# Patient Record
Sex: MALE | Race: WHITE | NOT HISPANIC OR LATINO | URBAN - METROPOLITAN AREA
[De-identification: names, ages, dates, MRNs, and addresses within clinical notes are randomized per-mention and may not be internally consistent; named-entity substitution may affect disease eponyms.]

---

## 2022-01-04 ENCOUNTER — NEW SKIN PROBLEM (OUTPATIENT)
Dept: URBAN - METROPOLITAN AREA CLINIC 38 | Facility: CLINIC | Age: 57
Setting detail: DERMATOLOGY
End: 2022-01-04

## 2022-01-04 DIAGNOSIS — L70.0 ACNE VULGARIS: ICD-10-CM

## 2022-01-04 PROBLEM — L82.1 OTHER SEBORRHEIC KERATOSIS: Status: RESOLVED | Noted: 2022-01-04

## 2022-01-04 PROBLEM — D48.5 NEOPLASM OF UNCERTAIN BEHAVIOR OF SKIN: Status: RESOLVED | Noted: 2022-01-04

## 2022-01-04 PROBLEM — D23.5 OTHER BENIGN NEOPLASM OF SKIN OF TRUNK: Status: RESOLVED | Noted: 2022-01-04

## 2022-01-04 PROCEDURE — 99203 OFFICE O/P NEW LOW 30 MIN: CPT

## 2022-01-26 ENCOUNTER — EXCISION (OUTPATIENT)
Dept: URBAN - METROPOLITAN AREA CLINIC 38 | Facility: CLINIC | Age: 57
Setting detail: DERMATOLOGY
End: 2022-01-26

## 2022-01-26 DIAGNOSIS — D23.5 OTHER BENIGN NEOPLASM OF SKIN OF TRUNK: ICD-10-CM

## 2022-01-26 DIAGNOSIS — Z85.820 PERSONAL HISTORY OF MALIGNANT MELANOMA OF SKIN: ICD-10-CM

## 2022-01-26 DIAGNOSIS — L90.5 SCAR CONDITIONS AND FIBROSIS OF SKIN: ICD-10-CM

## 2022-01-26 DIAGNOSIS — L57.8 OTHER SKIN CHANGES DUE TO CHRONIC EXPOSURE TO NONIONIZING RADIATION: ICD-10-CM

## 2022-01-26 DIAGNOSIS — L82.1 OTHER SEBORRHEIC KERATOSIS: ICD-10-CM

## 2022-01-26 PROBLEM — D48.5 NEOPLASM OF UNCERTAIN BEHAVIOR OF SKIN: Status: RESOLVED | Noted: 2022-01-26

## 2022-01-26 PROCEDURE — 11102 TANGNTL BX SKIN SINGLE LES: CPT

## 2023-04-17 ENCOUNTER — TELEPHONE (OUTPATIENT)
Dept: PRIMARY CARE | Facility: CLINIC | Age: 58
End: 2023-04-17
Payer: COMMERCIAL

## 2023-04-19 ENCOUNTER — OFFICE VISIT (OUTPATIENT)
Dept: PRIMARY CARE | Facility: CLINIC | Age: 58
End: 2023-04-19
Payer: COMMERCIAL

## 2023-04-19 VITALS
BODY MASS INDEX: 36.97 KG/M2 | HEIGHT: 74 IN | OXYGEN SATURATION: 99 % | WEIGHT: 288.1 LBS | HEART RATE: 72 BPM | SYSTOLIC BLOOD PRESSURE: 140 MMHG | DIASTOLIC BLOOD PRESSURE: 98 MMHG

## 2023-04-19 DIAGNOSIS — M10.9 GOUT OF FOOT, UNSPECIFIED CAUSE, UNSPECIFIED CHRONICITY, UNSPECIFIED LATERALITY: ICD-10-CM

## 2023-04-19 DIAGNOSIS — M17.11 PRIMARY OSTEOARTHRITIS OF RIGHT KNEE: ICD-10-CM

## 2023-04-19 DIAGNOSIS — M17.12 PRIMARY OSTEOARTHRITIS OF LEFT KNEE: Primary | ICD-10-CM

## 2023-04-19 DIAGNOSIS — I10 PRIMARY HYPERTENSION: ICD-10-CM

## 2023-04-19 DIAGNOSIS — E03.9 ACQUIRED HYPOTHYROIDISM: ICD-10-CM

## 2023-04-19 PROCEDURE — 1036F TOBACCO NON-USER: CPT | Performed by: FAMILY MEDICINE

## 2023-04-19 PROCEDURE — 99213 OFFICE O/P EST LOW 20 MIN: CPT | Performed by: FAMILY MEDICINE

## 2023-04-19 PROCEDURE — 3077F SYST BP >= 140 MM HG: CPT | Performed by: FAMILY MEDICINE

## 2023-04-19 PROCEDURE — 20610 DRAIN/INJ JOINT/BURSA W/O US: CPT | Performed by: FAMILY MEDICINE

## 2023-04-19 PROCEDURE — 3080F DIAST BP >= 90 MM HG: CPT | Performed by: FAMILY MEDICINE

## 2023-04-19 RX ORDER — INDOMETHACIN 50 MG/1
1 CAPSULE ORAL 2 TIMES DAILY
COMMUNITY
Start: 2023-02-22 | End: 2023-04-19 | Stop reason: SDUPTHER

## 2023-04-19 RX ORDER — LEVOTHYROXINE SODIUM 137 UG/1
1 TABLET ORAL DAILY
COMMUNITY
Start: 2019-10-31 | End: 2023-04-19 | Stop reason: SDUPTHER

## 2023-04-19 RX ORDER — ATENOLOL 100 MG/1
100 TABLET ORAL DAILY
Qty: 90 TABLET | Refills: 3 | Status: SHIPPED | OUTPATIENT
Start: 2023-04-19 | End: 2024-06-05 | Stop reason: WASHOUT

## 2023-04-19 RX ORDER — TRIAMCINOLONE ACETONIDE 40 MG/ML
40 INJECTION, SUSPENSION INTRA-ARTICULAR; INTRAMUSCULAR ONCE
Status: COMPLETED | OUTPATIENT
Start: 2023-04-19 | End: 2023-04-19

## 2023-04-19 RX ORDER — INDOMETHACIN 50 MG/1
50 CAPSULE ORAL 2 TIMES DAILY
Qty: 60 CAPSULE | Refills: 3 | Status: SHIPPED | OUTPATIENT
Start: 2023-04-19 | End: 2023-05-19

## 2023-04-19 RX ORDER — LISINOPRIL AND HYDROCHLOROTHIAZIDE 20; 25 MG/1; MG/1
1 TABLET ORAL DAILY
COMMUNITY
Start: 2020-03-02 | End: 2023-11-01 | Stop reason: ALTCHOICE

## 2023-04-19 RX ORDER — LISINOPRIL AND HYDROCHLOROTHIAZIDE 20; 25 MG/1; MG/1
2 TABLET ORAL DAILY
Qty: 180 TABLET | Refills: 3 | Status: SHIPPED | OUTPATIENT
Start: 2023-04-19 | End: 2024-04-18

## 2023-04-19 RX ORDER — ATENOLOL 100 MG/1
1 TABLET ORAL DAILY
COMMUNITY
Start: 2020-03-02 | End: 2023-04-19 | Stop reason: SDUPTHER

## 2023-04-19 RX ORDER — VENLAFAXINE HYDROCHLORIDE 75 MG/1
CAPSULE, EXTENDED RELEASE ORAL
COMMUNITY
Start: 2021-12-23

## 2023-04-19 RX ORDER — VENLAFAXINE HYDROCHLORIDE 150 MG/1
CAPSULE, EXTENDED RELEASE ORAL
COMMUNITY
Start: 2021-12-23

## 2023-04-19 RX ORDER — LEVOTHYROXINE SODIUM 137 UG/1
137 TABLET ORAL DAILY
Qty: 90 TABLET | Refills: 3 | Status: SHIPPED | OUTPATIENT
Start: 2023-04-19 | End: 2024-04-18

## 2023-04-19 RX ADMIN — TRIAMCINOLONE ACETONIDE 40 MG: 40 INJECTION, SUSPENSION INTRA-ARTICULAR; INTRAMUSCULAR at 12:17

## 2023-04-19 RX ADMIN — TRIAMCINOLONE ACETONIDE 40 MG: 40 INJECTION, SUSPENSION INTRA-ARTICULAR; INTRAMUSCULAR at 12:15

## 2023-04-19 ASSESSMENT — ENCOUNTER SYMPTOMS
HEADACHES: 0
DIZZINESS: 0
LIGHT-HEADEDNESS: 0

## 2023-04-19 ASSESSMENT — PATIENT HEALTH QUESTIONNAIRE - PHQ9
2. FEELING DOWN, DEPRESSED OR HOPELESS: NOT AT ALL
SUM OF ALL RESPONSES TO PHQ9 QUESTIONS 1 AND 2: 0
1. LITTLE INTEREST OR PLEASURE IN DOING THINGS: NOT AT ALL

## 2023-04-19 NOTE — PROGRESS NOTES
"Subjective   Patient ID: Ricardo Newman is a 57 y.o. male who presents for Injections (Cortisone injection both knees ).    HPI   Htn     Not well controlled will increase his lisinopril    ADRIAN knee OA    Has been over a year since last knee injection       Gout foot either side    Indocin works better than  colchicine    Procedure adrian knee   the injection site was marked with the blunt end of pen - medial  joint line  to the medial inferior aspect of the patella. The area was prepped in a sterile fashion using betadine and 70% alcohol. Using a sterile, 'no touch' techinque, 2 ml of 2% lidocaine and 1 ml ofkenalog 40 mg/ml were injected into the joint space via 25 g 1 inch needle; prior to injection, the area was aspirated and there was no blood return. The patient tolerated the procedure well. There was zero blood loss.    Review of Systems   Cardiovascular:  Negative for chest pain.   Neurological:  Negative for dizziness, light-headedness and headaches.       Objective   BP (!) 140/98   Pulse 72   Ht 1.88 m (6' 2\")   Wt 131 kg (288 lb 1.6 oz)   SpO2 99%   BMI 36.99 kg/m²     Physical Exam  Vitals reviewed.   Constitutional:       Appearance: Normal appearance.   HENT:      Head: Normocephalic and atraumatic.   Eyes:      Conjunctiva/sclera: Conjunctivae normal.   Cardiovascular:      Rate and Rhythm: Normal rate and regular rhythm.   Pulmonary:      Effort: Pulmonary effort is normal.      Breath sounds: Normal breath sounds.   Musculoskeletal:      Cervical back: Neck supple.   Skin:     General: Skin is warm and dry.   Neurological:      General: No focal deficit present.      Mental Status: He is alert and oriented to person, place, and time.   Psychiatric:         Mood and Affect: Mood normal.         Behavior: Behavior normal.         Thought Content: Thought content normal.         Judgment: Judgment normal.         Assessment/Plan   Diagnoses and all orders for this visit:  Primary osteoarthritis of " left knee  -     triamcinolone acetonide (Kenalog-40) injection 40 mg  Primary osteoarthritis of right knee  -     triamcinolone acetonide (Kenalog-40) injection 40 mg  Gout of foot, unspecified cause, unspecified chronicity, unspecified laterality  -     indomethacin (Indocin) 50 mg capsule; Take 1 capsule (50 mg) by mouth in the morning and 1 capsule (50 mg) before bedtime.  Primary hypertension  -     atenolol (Tenormin) 100 mg tablet; Take 1 tablet (100 mg) by mouth once daily.  -     lisinopriL-hydrochlorothiazide 20-25 mg tablet; Take 2 tablets by mouth once daily.  Acquired hypothyroidism  -     levothyroxine (Synthroid, Levoxyl) 137 mcg tablet; Take 1 tablet (137 mcg) by mouth once daily.  Other orders  -     Follow Up In Primary Care; Future

## 2023-09-08 ENCOUNTER — TELEPHONE (OUTPATIENT)
Dept: PRIMARY CARE | Facility: CLINIC | Age: 58
End: 2023-09-08

## 2023-09-08 NOTE — TELEPHONE ENCOUNTER
Pt called asking if he was able to get a cortisone shot in each knee, he is unsure when his ;ast one was

## 2023-09-13 NOTE — TELEPHONE ENCOUNTER
Pt called back about this - says where he is calls may not go through - can leave a VM about this.

## 2023-09-13 NOTE — TELEPHONE ENCOUNTER
Called again and left VM to let pt know we can make an apt for Cortisone injection and to call back to make apt.

## 2023-10-12 ENCOUNTER — LAB (OUTPATIENT)
Dept: LAB | Facility: LAB | Age: 58
End: 2023-10-12
Payer: COMMERCIAL

## 2023-10-12 ENCOUNTER — ANCILLARY PROCEDURE (OUTPATIENT)
Dept: RADIOLOGY | Facility: CLINIC | Age: 58
End: 2023-10-12
Payer: COMMERCIAL

## 2023-10-12 ENCOUNTER — OFFICE VISIT (OUTPATIENT)
Dept: PRIMARY CARE | Facility: CLINIC | Age: 58
End: 2023-10-12
Payer: COMMERCIAL

## 2023-10-12 VITALS
BODY MASS INDEX: 36.23 KG/M2 | SYSTOLIC BLOOD PRESSURE: 152 MMHG | DIASTOLIC BLOOD PRESSURE: 90 MMHG | WEIGHT: 282.3 LBS | HEIGHT: 74 IN | HEART RATE: 72 BPM

## 2023-10-12 DIAGNOSIS — M25.531 RIGHT WRIST PAIN: ICD-10-CM

## 2023-10-12 DIAGNOSIS — Z79.1 NSAID LONG-TERM USE: ICD-10-CM

## 2023-10-12 DIAGNOSIS — Z79.1 NSAID LONG-TERM USE: Primary | ICD-10-CM

## 2023-10-12 DIAGNOSIS — M70.21 OLECRANON BURSITIS OF RIGHT ELBOW: ICD-10-CM

## 2023-10-12 PROBLEM — M25.562 LEFT KNEE PAIN: Status: ACTIVE | Noted: 2023-10-12

## 2023-10-12 PROBLEM — I10 BENIGN ESSENTIAL HYPERTENSION: Status: ACTIVE | Noted: 2021-11-09

## 2023-10-12 PROBLEM — E03.9 HYPOTHYROIDISM: Status: ACTIVE | Noted: 2023-10-12

## 2023-10-12 PROBLEM — R16.0 HEPATOMEGALY: Status: ACTIVE | Noted: 2023-10-12

## 2023-10-12 PROBLEM — N28.1 RENAL CYST: Status: ACTIVE | Noted: 2023-10-12

## 2023-10-12 LAB
ANION GAP SERPL CALC-SCNC: 12 MMOL/L (ref 10–20)
BUN SERPL-MCNC: 21 MG/DL (ref 6–23)
CALCIUM SERPL-MCNC: 9.5 MG/DL (ref 8.6–10.3)
CHLORIDE SERPL-SCNC: 103 MMOL/L (ref 98–107)
CO2 SERPL-SCNC: 29 MMOL/L (ref 21–32)
CREAT SERPL-MCNC: 0.77 MG/DL (ref 0.5–1.3)
GFR SERPL CREATININE-BSD FRML MDRD: >90 ML/MIN/1.73M*2
GLUCOSE SERPL-MCNC: 101 MG/DL (ref 74–99)
POTASSIUM SERPL-SCNC: 4.5 MMOL/L (ref 3.5–5.3)
SODIUM SERPL-SCNC: 139 MMOL/L (ref 136–145)

## 2023-10-12 PROCEDURE — 99213 OFFICE O/P EST LOW 20 MIN: CPT | Performed by: NURSE PRACTITIONER

## 2023-10-12 PROCEDURE — 73100 X-RAY EXAM OF WRIST: CPT | Mod: RT

## 2023-10-12 PROCEDURE — 73100 X-RAY EXAM OF WRIST: CPT | Mod: RIGHT SIDE | Performed by: RADIOLOGY

## 2023-10-12 PROCEDURE — 80048 BASIC METABOLIC PNL TOTAL CA: CPT

## 2023-10-12 PROCEDURE — 3080F DIAST BP >= 90 MM HG: CPT | Performed by: NURSE PRACTITIONER

## 2023-10-12 PROCEDURE — 3077F SYST BP >= 140 MM HG: CPT | Performed by: NURSE PRACTITIONER

## 2023-10-12 PROCEDURE — 1036F TOBACCO NON-USER: CPT | Performed by: NURSE PRACTITIONER

## 2023-10-12 PROCEDURE — 36415 COLL VENOUS BLD VENIPUNCTURE: CPT

## 2023-10-12 RX ORDER — INDOMETHACIN 50 MG/1
50 CAPSULE ORAL
COMMUNITY
Start: 2023-09-13 | End: 2024-04-15 | Stop reason: WASHOUT

## 2023-10-12 NOTE — PROGRESS NOTES
"Subjective   Patient ID: Ricadro Newman is a 58 y.o. male who presents for Elbow Pain (Right - Has been going on years) and Wrist Pain (right).    Wrist pain to right wrist, uses hand/wrist with repetitive motions frequently at wok and at leisure  Wrist pain worsening over last week or two  Previous XR showed:  No acute osseous injury of the right wrist.    Is taking indomethacin previously prescribed.   Would like repeat XR    Was seen in Urgent care last week prescribed steroids, only took 1-2 days worth felt better, after steroid stopped pain returned.   Has swelling, warmth to the right elbow, is scheduled to see ortho. Known bursitis                 Review of Systems    Objective   /90 (Patient Position: Sitting)   Pulse 72   Ht 1.88 m (6' 2\")   Wt 128 kg (282 lb 4.8 oz)   BMI 36.25 kg/m²     Physical Exam  Vitals reviewed.   Constitutional:       General: He is not in acute distress.     Appearance: Normal appearance. He is obese.   Cardiovascular:      Rate and Rhythm: Normal rate and regular rhythm.   Pulmonary:      Effort: Pulmonary effort is normal.      Breath sounds: Normal breath sounds.   Musculoskeletal:      Right elbow: Swelling and effusion present. Tenderness present.      Right wrist: Tenderness present. No swelling or deformity. Decreased range of motion.   Skin:     General: Skin is warm and dry.   Neurological:      Mental Status: He is alert and oriented to person, place, and time.         Assessment/Plan   Diagnoses and all orders for this visit:  NSAID long-term use  -     Basic metabolic panel; Future  Continue on Indomethacin twice daily as previously prescribed  Right wrist pain  -     XR wrist right 1-2 views; Future  NSAID as above  Olecranon bursitis of right elbow   Following with orthopedics.      "

## 2023-10-19 ENCOUNTER — APPOINTMENT (OUTPATIENT)
Dept: PRIMARY CARE | Facility: CLINIC | Age: 58
End: 2023-10-19
Payer: COMMERCIAL

## 2023-10-30 RX ORDER — COLCHICINE 0.6 MG/1
0.6 TABLET ORAL 3 TIMES DAILY
COMMUNITY
End: 2023-11-01 | Stop reason: ALTCHOICE

## 2023-11-01 ENCOUNTER — OFFICE VISIT (OUTPATIENT)
Dept: ORTHOPEDIC SURGERY | Facility: CLINIC | Age: 58
End: 2023-11-01
Payer: COMMERCIAL

## 2023-11-01 ENCOUNTER — ANCILLARY PROCEDURE (OUTPATIENT)
Dept: RADIOLOGY | Facility: CLINIC | Age: 58
End: 2023-11-01
Payer: COMMERCIAL

## 2023-11-01 DIAGNOSIS — M25.562 ACUTE BILATERAL KNEE PAIN: ICD-10-CM

## 2023-11-01 DIAGNOSIS — M25.561 ACUTE BILATERAL KNEE PAIN: ICD-10-CM

## 2023-11-01 DIAGNOSIS — M17.0 PRIMARY OSTEOARTHRITIS OF BOTH KNEES: Primary | ICD-10-CM

## 2023-11-01 PROCEDURE — 1036F TOBACCO NON-USER: CPT | Performed by: NURSE PRACTITIONER

## 2023-11-01 PROCEDURE — 3077F SYST BP >= 140 MM HG: CPT | Performed by: NURSE PRACTITIONER

## 2023-11-01 PROCEDURE — 73562 X-RAY EXAM OF KNEE 3: CPT | Mod: 50

## 2023-11-01 PROCEDURE — 73562 X-RAY EXAM OF KNEE 3: CPT | Mod: BILATERAL PROCEDURE | Performed by: RADIOLOGY

## 2023-11-01 PROCEDURE — 3080F DIAST BP >= 90 MM HG: CPT | Performed by: NURSE PRACTITIONER

## 2023-11-01 PROCEDURE — 99204 OFFICE O/P NEW MOD 45 MIN: CPT | Performed by: NURSE PRACTITIONER

## 2023-11-01 ASSESSMENT — ENCOUNTER SYMPTOMS
RESPIRATORY NEGATIVE: 1
ARTHRALGIAS: 1
HEMATOLOGIC/LYMPHATIC NEGATIVE: 1
CONSTITUTIONAL NEGATIVE: 1
NEUROLOGICAL NEGATIVE: 1
ENDOCRINE NEGATIVE: 1
PSYCHIATRIC NEGATIVE: 1
CARDIOVASCULAR NEGATIVE: 1

## 2023-11-01 ASSESSMENT — PAIN SCALES - GENERAL: PAINLEVEL_OUTOF10: 6

## 2023-11-01 ASSESSMENT — PAIN DESCRIPTION - DESCRIPTORS: DESCRIPTORS: SHARP;ACHING

## 2023-11-01 ASSESSMENT — PAIN - FUNCTIONAL ASSESSMENT: PAIN_FUNCTIONAL_ASSESSMENT: 0-10

## 2023-11-01 NOTE — ASSESSMENT & PLAN NOTE
Reviewed symptom control with NSAIDs per her PCP.  I did discuss that we do not typically keep patients on indomethacin for arthritis pain and will need to follow with PCP regarding this particular medication.  I did instruct patient on extra strength Tylenol or Tylenol arthritis as needed per package directions.  Also recommended diclofenac gel 4 times daily to the bilateral knees.  Patient may continue to use the compression knee braces he has with aggravating activities.  We did discuss risk of benefits of both cortisone and viscosupplementation.  At this time, I am requesting viscosupplementation to the bilateral knees for symptom control.  Patient is aware this will be administered once weekly over 3 weeks with use of the ultrasound here in the office.  Patient is in agreement with plan of care.  This note was generated using Dragon software.  It may contain errors in wording, punctuation or spelling.
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
## 2023-11-13 ENCOUNTER — OFFICE VISIT (OUTPATIENT)
Dept: ORTHOPEDIC SURGERY | Facility: CLINIC | Age: 58
End: 2023-11-13
Payer: COMMERCIAL

## 2023-11-13 DIAGNOSIS — M25.561 ACUTE BILATERAL KNEE PAIN: ICD-10-CM

## 2023-11-13 DIAGNOSIS — M17.0 PRIMARY OSTEOARTHRITIS OF BOTH KNEES: Primary | ICD-10-CM

## 2023-11-13 DIAGNOSIS — M25.562 ACUTE BILATERAL KNEE PAIN: ICD-10-CM

## 2023-11-13 PROCEDURE — 1036F TOBACCO NON-USER: CPT | Performed by: NURSE PRACTITIONER

## 2023-11-13 PROCEDURE — 3077F SYST BP >= 140 MM HG: CPT | Performed by: NURSE PRACTITIONER

## 2023-11-13 PROCEDURE — 20611 DRAIN/INJ JOINT/BURSA W/US: CPT | Performed by: NURSE PRACTITIONER

## 2023-11-13 PROCEDURE — 3080F DIAST BP >= 90 MM HG: CPT | Performed by: NURSE PRACTITIONER

## 2023-11-13 ASSESSMENT — ENCOUNTER SYMPTOMS
RESPIRATORY NEGATIVE: 1
CARDIOVASCULAR NEGATIVE: 1
NEUROLOGICAL NEGATIVE: 1
HEMATOLOGIC/LYMPHATIC NEGATIVE: 1
ENDOCRINE NEGATIVE: 1
CONSTITUTIONAL NEGATIVE: 1
PSYCHIATRIC NEGATIVE: 1
ARTHRALGIAS: 1

## 2023-11-13 NOTE — ASSESSMENT & PLAN NOTE
Sx control with OTC Tylenol per package directions prn.  Rest, ice, elevate and compression/brace with weight bearing activity, off with rest and sleep  Knee bracing with repetitive or aggravating activities, may remove with rest and sleep.   HEP  Activity restriction recommended: light activity today, advance as tolerated  Follow up here 1 week for second visco injections, sooner for changes or concerns.

## 2023-11-13 NOTE — PROGRESS NOTES
Assessment/Plan     Encounter Diagnoses:  Primary osteoarthritis of both knees  Sx control with OTC Tylenol per package directions prn.  Rest, ice, elevate and compression/brace with weight bearing activity, off with rest and sleep  Knee bracing with repetitive or aggravating activities, may remove with rest and sleep.   HEP  Activity restriction recommended: light activity today, advance as tolerated  Follow up here 1 week for second visco injections, sooner for changes or concerns.      Problem List Items Addressed This Visit             ICD-10-CM    Acute bilateral knee pain M25.561, M25.562    Relevant Orders    Point of Care Ultrasound (Completed)    Follow Up In Orthopaedic Surgery        Subjective    Patient ID: Ricardo Newman is a 58 y.o. male.  Presents today for visco supplementation of Euflexxa  Injection # 1/3 bilat knees for OA         Review of Systems   Constitutional: Negative.    HENT: Negative.     Respiratory: Negative.     Cardiovascular: Negative.    Endocrine: Negative.    Musculoskeletal:  Positive for arthralgias.   Skin: Negative.    Neurological: Negative.    Hematological: Negative.    Psychiatric/Behavioral: Negative.           OBJECTIVE: ORTHO EXAM  Right Knee Exam      Tenderness   The patient is experiencing tenderness in the medial joint line.     Range of Motion   Extension:  normal   Flexion:  120 abnormal      Tests   Soren:  Medial - negative Lateral - negative  Varus: negative Valgus: negative  Drawer:  Anterior - negative    Posterior - negative     Other   Erythema: absent  Sensation: normal  Pulse: present  Swelling: none  Effusion: no effusion present     Comments:  Medial knee symptoms mildly aggravated with attempt at squat and rotation with weightbearing.  Full ROM of distal joints with no sx aggravation  Distal motor and sensory intact, cap refill at 2 seconds.        Left Knee Exam      Tenderness   The patient is experiencing tenderness in the medial joint line and  lateral joint line.     Range of Motion   Extension:  5 abnormal   Flexion:  100 abnormal      Tests   Soren:  Medial - negative Lateral - negative  Varus: negative Valgus: negative  Drawer:  Anterior - negative     Posterior - negative     Other   Erythema: absent  Sensation: normal  Pulse: present  Left knee swelling: Patient has marble sized fixed swelling patellar tendon, nonmobile.  No tenderness with palpation or motion, no changes in sizes or appearance.     Comments:  Mild varus gait on L  Full ROM of distal joints with no sx aggravation  Distal motor and sensory intact, cap refill at 2 seconds.      IMAGE RESULTS:  XR knee 3 views bilateral  Narrative: Interpreted By:  Kris Carballo,   STUDY:  XR KNEE 3 VIEWS BILATERAL      INDICATION:  Signs/Symptoms:Pain.      COMPARISON:  None      ACCESSION NUMBER(S):  [id number]      ORDERING CLINICIAN:  RUFINO DENSON      FINDINGS:  Fairly advanced osteoarthritis bilateral knees particularly in the  medial compartments.      No fractures or lesions bilaterally.      Impression: Fairly advanced osteoarthritis bilateral knees particularly in the  medial compartments.      Signed by: Kris Carballo 11/2/2023 12:20 PM  Dictation workstation:   [id number]        L Inj/Asp: bilateral knee on 11/13/2023 4:19 PM  Indications: pain and joint swelling  Details: 22 G needle, ultrasound-guided superolateral approach  Medications (Right): 20 mg sodium hyaluronate 10 mg/mL(mw 2.4 -3.6 million)  Medications (Left): 20 mg sodium hyaluronate 10 mg/mL(mw 2.4 -3.6 million)  Outcome: tolerated well, no immediate complications    We discussed risk and benefits of cortisone injection, patient wishes to proceed via verbal consent.  Skin was prepped with Betadine, Vapocoolant spray and alcohol.  Direct visualization using high-frequency linear probe of ultrasound was utilized to administer the injection of *.   Images were saved under MRN number into the PACS system.     Procedure,  treatment alternatives, risks and benefits explained, specific risks discussed. Consent was given by the patient. Immediately prior to procedure a time out was called to verify the correct patient, procedure, equipment, support staff and site/side marked as required. Patient was prepped and draped in the usual sterile fashion.            Orders Placed This Encounter    Point of Care Ultrasound    Follow Up In Orthopaedic Surgery

## 2023-11-20 ENCOUNTER — OFFICE VISIT (OUTPATIENT)
Dept: ORTHOPEDIC SURGERY | Facility: CLINIC | Age: 58
End: 2023-11-20
Payer: COMMERCIAL

## 2023-11-20 DIAGNOSIS — M17.0 PRIMARY OSTEOARTHRITIS OF BOTH KNEES: ICD-10-CM

## 2023-11-20 PROCEDURE — 20611 DRAIN/INJ JOINT/BURSA W/US: CPT | Performed by: NURSE PRACTITIONER

## 2023-11-20 PROCEDURE — 1036F TOBACCO NON-USER: CPT | Performed by: NURSE PRACTITIONER

## 2023-11-20 PROCEDURE — 99024 POSTOP FOLLOW-UP VISIT: CPT | Performed by: NURSE PRACTITIONER

## 2023-11-20 ASSESSMENT — ENCOUNTER SYMPTOMS
ARTHRALGIAS: 1
PSYCHIATRIC NEGATIVE: 1
HEMATOLOGIC/LYMPHATIC NEGATIVE: 1
CARDIOVASCULAR NEGATIVE: 1
RESPIRATORY NEGATIVE: 1
NEUROLOGICAL NEGATIVE: 1
ENDOCRINE NEGATIVE: 1
CONSTITUTIONAL NEGATIVE: 1

## 2023-11-20 ASSESSMENT — PAIN - FUNCTIONAL ASSESSMENT: PAIN_FUNCTIONAL_ASSESSMENT: NO/DENIES PAIN

## 2023-11-20 NOTE — PROGRESS NOTES
Assessment/Plan     Encounter Diagnoses:    Problem List Items Addressed This Visit             ICD-10-CM    Primary osteoarthritis of both knees M17.0    Relevant Orders    Point of Care Ultrasound (Completed)        HPI:  Patient ID: Ricardo Newman is a 58 y.o. male.  Presents today for visco supplementation of Euflexxa  Injection # 2/3 bilat knees for OA  No adverse reaction to last week's injections, mild sx improvement.        Review of Systems   Constitutional: Negative.    HENT: Negative.     Respiratory: Negative.     Cardiovascular: Negative.    Endocrine: Negative.    Musculoskeletal:  Positive for arthralgias.   Skin: Negative.    Neurological: Negative.    Hematological: Negative.    Psychiatric/Behavioral: Negative.           OBJECTIVE: ORTHO EXAM  Right Knee Exam      Tenderness   The patient is experiencing tenderness in the medial joint line.     Range of Motion   Extension:  normal   Flexion:  120 abnormal      Tests   Soren:  Medial - negative Lateral - negative  Varus: negative Valgus: negative  Drawer:  Anterior - negative    Posterior - negative     Other   Erythema: absent  Sensation: normal  Pulse: present  Swelling: none  Effusion: no effusion present     Comments:  Medial knee symptoms mildly aggravated with attempt at squat and rotation with weightbearing.  Full ROM of distal joints with no sx aggravation  Distal motor and sensory intact, cap refill at 2 seconds.        Left Knee Exam      Tenderness   The patient is experiencing tenderness in the medial joint line and lateral joint line.     Range of Motion   Extension:  5 abnormal   Flexion:  100 abnormal      Tests   Soren:  Medial - negative Lateral - negative  Varus: negative Valgus: negative  Drawer:  Anterior - negative     Posterior - negative     Other   Erythema: absent  Sensation: normal  Pulse: present  Left knee swelling: Patient has marble sized fixed swelling patellar tendon, nonmobile.  No tenderness with palpation or  motion, no changes in sizes or appearance.     Comments:  Mild varus gait on L  Full ROM of distal joints with no sx aggravation  Distal motor and sensory intact, cap refill at 2 seconds.      IMAGE RESULTS:  XR knee 3 views bilateral  Narrative: Interpreted By:  Kris Carballo,   STUDY:  XR KNEE 3 VIEWS BILATERAL      INDICATION:  Signs/Symptoms:Pain.      COMPARISON:  None      ACCESSION NUMBER(S):  [id number]      ORDERING CLINICIAN:  RUFINO DENSON      FINDINGS:  Fairly advanced osteoarthritis bilateral knees particularly in the  medial compartments.      No fractures or lesions bilaterally.      Impression: Fairly advanced osteoarthritis bilateral knees particularly in the  medial compartments.      Signed by: Kris Carballo 11/2/2023 12:20 PM  Dictation workstation:   [id number]        L Inj/Asp: bilateral knee on 11/20/2023 3:21 PM  Indications: pain and joint swelling  Details: 22 G needle, ultrasound-guided superolateral approach  Medications (Right): 20 mg sodium hyaluronate (Euflexxa) intra-articular prefilled syringe 20 mg/2mL  Outcome: tolerated well, no immediate complications    Patient wishes to proceed via verbal consent.  Skin was prepped with Betadine, Vapocoolant spray and alcohol.  Direct visualization using high-frequency linear probe of ultrasound was utilized to administer the injection of Euflexxa.   Images were saved under MRN number into the PACS system.   Bandaid to site post injection, no bleeding.   Euflexxa to bilat knees     Procedure, treatment alternatives, risks and benefits explained, specific risks discussed. Consent was given by the patient. Immediately prior to procedure a time out was called to verify the correct patient, procedure, equipment, support staff and site/side marked as required. Patient was prepped and draped in the usual sterile fashion.            Orders Placed This Encounter    Point of Care Ultrasound

## 2023-11-27 ENCOUNTER — OFFICE VISIT (OUTPATIENT)
Dept: ORTHOPEDIC SURGERY | Facility: CLINIC | Age: 58
End: 2023-11-27
Payer: COMMERCIAL

## 2023-11-27 DIAGNOSIS — M17.0 PRIMARY OSTEOARTHRITIS OF BOTH KNEES: ICD-10-CM

## 2023-11-27 PROCEDURE — 20611 DRAIN/INJ JOINT/BURSA W/US: CPT | Performed by: NURSE PRACTITIONER

## 2023-11-27 PROCEDURE — 1036F TOBACCO NON-USER: CPT | Performed by: NURSE PRACTITIONER

## 2023-11-27 PROCEDURE — 99024 POSTOP FOLLOW-UP VISIT: CPT | Performed by: NURSE PRACTITIONER

## 2023-11-27 ASSESSMENT — ENCOUNTER SYMPTOMS
CONSTITUTIONAL NEGATIVE: 1
CARDIOVASCULAR NEGATIVE: 1
RESPIRATORY NEGATIVE: 1
ENDOCRINE NEGATIVE: 1
HEMATOLOGIC/LYMPHATIC NEGATIVE: 1
NEUROLOGICAL NEGATIVE: 1
ARTHRALGIAS: 1
PSYCHIATRIC NEGATIVE: 1

## 2023-11-27 ASSESSMENT — PAIN - FUNCTIONAL ASSESSMENT: PAIN_FUNCTIONAL_ASSESSMENT: 0-10

## 2023-11-27 ASSESSMENT — PAIN SCALES - GENERAL: PAINLEVEL_OUTOF10: 5 - MODERATE PAIN

## 2023-11-27 ASSESSMENT — PAIN DESCRIPTION - DESCRIPTORS: DESCRIPTORS: ACHING;DULL

## 2023-11-27 NOTE — ASSESSMENT & PLAN NOTE
We reviewed conservative measures for knee OA symptom control.  Patient to continue to take Tylenol per package directions, diclofenac gel up to 4 times daily.  We reviewed bracing and wrapping for aggravating activities.  Reviewed rest and elevation.  Home exercises for knee OA were reviewed and patient encouraged to begin in approximately 1 week and advance as tolerated.  Reviewed plan for gradual resumption of stationary bike with no resistance, low time and advance as tolerated. Plan will be to follow-up here in approximately 3 months, sooner for changes or concerns.     This note was generated using Dragon software.  It may contain errors in wording, punctuation or spelling.

## 2023-11-27 NOTE — PROGRESS NOTES
Assessment/Plan     Encounter Diagnoses:    Problem List Items Addressed This Visit             ICD-10-CM    Primary osteoarthritis of both knees M17.0    Relevant Orders    Point of Care Ultrasound (Completed)        HPI:  Patient ID: Ricardo Newman is a 58 y.o. male.  Presents today for visco supplementation of Euflexxa  Injection # 3/3 bilat knees for OA  No adverse reaction to last week's injections, mild sx improvement.        Review of Systems   Constitutional: Negative.    HENT: Negative.     Respiratory: Negative.     Cardiovascular: Negative.    Endocrine: Negative.    Musculoskeletal:  Positive for arthralgias.   Skin: Negative.    Neurological: Negative.    Hematological: Negative.    Psychiatric/Behavioral: Negative.           OBJECTIVE: ORTHO EXAM  Right Knee Exam      Tenderness   The patient is experiencing tenderness in the medial joint line.     Range of Motion   Extension:  normal   Flexion:  120 abnormal      Tests   Soren:  Medial - negative Lateral - negative  Varus: negative Valgus: negative  Drawer:  Anterior - negative    Posterior - negative     Other   Erythema: absent  Sensation: normal  Pulse: present  Swelling: none  Effusion: no effusion present     Comments:  Medial knee symptoms mildly aggravated with attempt at squat and rotation with weightbearing.  Full ROM of distal joints with no sx aggravation  Distal motor and sensory intact, cap refill at 2 seconds.        Left Knee Exam      Tenderness   The patient is experiencing tenderness in the medial joint line and lateral joint line.     Range of Motion   Extension:  5 abnormal   Flexion:  100 abnormal      Tests   Soren:  Medial - negative Lateral - negative  Varus: negative Valgus: negative  Drawer:  Anterior - negative     Posterior - negative     Other   Erythema: absent  Sensation: normal  Pulse: present  Left knee swelling: Patient has marble sized fixed swelling patellar tendon, nonmobile.  No tenderness with palpation or  motion, no changes in sizes or appearance.     Comments:  Mild varus gait on L  Full ROM of distal joints with no sx aggravation  Distal motor and sensory intact, cap refill at 2 seconds.      IMAGE RESULTS:  XR knee 3 views bilateral  Narrative: Interpreted By:  Kris Carballo,   STUDY:  XR KNEE 3 VIEWS BILATERAL      INDICATION:  Signs/Symptoms:Pain.      COMPARISON:  None      ACCESSION NUMBER(S):  [id number]      ORDERING CLINICIAN:  RUFINO DENSON      FINDINGS:  Fairly advanced osteoarthritis bilateral knees particularly in the  medial compartments.      No fractures or lesions bilaterally.      Impression: Fairly advanced osteoarthritis bilateral knees particularly in the  medial compartments.      Signed by: Kris aCrballo 11/2/2023 12:20 PM  Dictation workstation:   [id number]        L Inj/Asp: bilateral knee on 11/27/2023 4:18 PM  Indications: pain and joint swelling  Details: 22 G needle, ultrasound-guided superolateral approach  Medications (Right): 20 mg sodium hyaluronate 10 mg/mL(mw 2.4 -3.6 million)  Medications (Left): 20 mg sodium hyaluronate 10 mg/mL(mw 2.4 -3.6 million)  Outcome: tolerated well, no immediate complications    Patient wishes to proceed via verbal consent.  Skin was prepped with Betadine, Vapocoolant spray and alcohol.  Direct visualization using high-frequency linear probe of ultrasound was utilized to administer the injection of Euflexxa bilat knees.   Images were saved under MRN number into the PACS system.   Bandaid to site post injection, no bleeding.        Procedure, treatment alternatives, risks and benefits explained, specific risks discussed. Consent was given by the patient. Immediately prior to procedure a time out was called to verify the correct patient, procedure, equipment, support staff and site/side marked as required. Patient was prepped and draped in the usual sterile fashion.            Orders Placed This Encounter    Point of Care Ultrasound      Problem List  Items Addressed This Visit             ICD-10-CM    Primary osteoarthritis of both knees M17.0     We reviewed conservative measures for knee OA symptom control.  Patient to continue to take Tylenol per package directions, diclofenac gel up to 4 times daily.  We reviewed bracing and wrapping for aggravating activities.  Reviewed rest and elevation.  Home exercises for knee OA were reviewed and patient encouraged to begin in approximately 1 week and advance as tolerated.  Reviewed plan for gradual resumption of stationary bike with no resistance, low time and advance as tolerated. Plan will be to follow-up here in approximately 3 months, sooner for changes or concerns.     This note was generated using Dragon software.  It may contain errors in wording, punctuation or spelling.         Relevant Orders    Point of Care Ultrasound (Completed)    Follow Up In Orthopaedic Surgery

## 2024-01-15 ENCOUNTER — OFFICE VISIT (OUTPATIENT)
Dept: ORTHOPEDIC SURGERY | Facility: CLINIC | Age: 59
End: 2024-01-15
Payer: COMMERCIAL

## 2024-01-15 DIAGNOSIS — M17.0 PRIMARY OSTEOARTHRITIS OF BOTH KNEES: Primary | ICD-10-CM

## 2024-01-15 PROCEDURE — 99214 OFFICE O/P EST MOD 30 MIN: CPT | Performed by: NURSE PRACTITIONER

## 2024-01-15 PROCEDURE — 20611 DRAIN/INJ JOINT/BURSA W/US: CPT | Performed by: NURSE PRACTITIONER

## 2024-01-15 PROCEDURE — 1036F TOBACCO NON-USER: CPT | Performed by: NURSE PRACTITIONER

## 2024-01-15 RX ORDER — TRIAMCINOLONE ACETONIDE 40 MG/ML
40 INJECTION, SUSPENSION INTRA-ARTICULAR; INTRAMUSCULAR
Status: COMPLETED | OUTPATIENT
Start: 2024-01-15 | End: 2024-01-15

## 2024-01-15 RX ORDER — MELOXICAM 15 MG/1
15 TABLET ORAL ONCE
Status: SHIPPED | OUTPATIENT
Start: 2024-01-15

## 2024-01-15 RX ADMIN — TRIAMCINOLONE ACETONIDE 40 MG: 40 INJECTION, SUSPENSION INTRA-ARTICULAR; INTRAMUSCULAR at 12:20

## 2024-01-15 ASSESSMENT — PAIN - FUNCTIONAL ASSESSMENT: PAIN_FUNCTIONAL_ASSESSMENT: 0-10

## 2024-01-15 ASSESSMENT — ENCOUNTER SYMPTOMS
PSYCHIATRIC NEGATIVE: 1
ENDOCRINE NEGATIVE: 1
CONSTITUTIONAL NEGATIVE: 1
HEMATOLOGIC/LYMPHATIC NEGATIVE: 1
CARDIOVASCULAR NEGATIVE: 1
ARTHRALGIAS: 1
NEUROLOGICAL NEGATIVE: 1
RESPIRATORY NEGATIVE: 1

## 2024-01-15 ASSESSMENT — PAIN SCALES - GENERAL: PAINLEVEL_OUTOF10: 5 - MODERATE PAIN

## 2024-01-15 ASSESSMENT — PAIN DESCRIPTION - DESCRIPTORS: DESCRIPTORS: ACHING;SHARP

## 2024-01-15 NOTE — ASSESSMENT & PLAN NOTE
We reviewed conservative measures for knee OA symptom control.  Patient to continue to take Tylenol per package directions, diclofenac gel up to 4 times daily.  We reviewed bracing and wrapping for aggravating activities.  Resumption of home exercises for knee OA in approximately 1 week and advance as tolerated.  Reviewed plan for gradual resumption of stationary bike with no resistance, low time and advance as tolerated. Plan will be to follow-up here in approximately 2-3 months, sooner for changes or concerns.   Patient with multiple questions about surgical intervention and consult, we reviewed sx control and will plan for next appt with Dr. Pina to assess effectiveness of the cortisone injections as well as introduce surgical options and answer any questions.  Patient did inquire about a refill of his long-term indomethacin he has been taking for arthritis pain.  We did discuss other options such as meloxicam or Celebrex for arthritis pain management, especially knee arthritis.  Patient is interested in trying the meloxicam once daily, may use Tylenol or Tylenol arthritis in addition to this.  He was instructed not to take other NSAIDs while on this medication.  This note was generated using Dragon software.  It may contain errors in wording, punctuation or spelling.

## 2024-01-15 NOTE — PROGRESS NOTES
Assessment/Plan     Encounter Diagnoses:        HPI:  Patient ID: Ricardo Newman is a 58 y.o. male.    Ricardo is a pleasant 58-year-old gentleman presenting today for 8-week follow-up after viscosupplementation to bilateral knees.  Does not feel visco inj helped sx, very short term increased ROM  With some improvement in the past with cortisone and is interested in pursuing if that is possible today  R knee medial aggravated, needs more frequent breaks  Feels tight in posterior knees      Review of Systems   Constitutional: Negative.    HENT: Negative.     Respiratory: Negative.     Cardiovascular: Negative.    Endocrine: Negative.    Musculoskeletal:  Positive for arthralgias.   Skin: Negative.    Neurological: Negative.    Hematological: Negative.    Psychiatric/Behavioral: Negative.           OBJECTIVE: ORTHO EXAM  Right Knee Exam      Tenderness   The patient is experiencing tenderness in the medial joint line.     Range of Motion   Extension:  normal   Flexion:  120 abnormal      Tests   Soren:  Medial - positive Lateral - negative  Varus: negative Valgus: negative  Drawer:  Anterior - negative    Posterior - negative     Other   Erythema: absent  Sensation: normal  Pulse: present  Swelling: none  Effusion: no effusion present     Comments:  Medial knee symptoms aggravated with attempt at squat and rotation with weightbearing.  Full ROM of distal joints with no sx aggravation  Distal motor and sensory intact, cap refill at 2 seconds.        Left Knee Exam      Tenderness   The patient is experiencing tenderness in the medial joint line and lateral joint line.     Range of Motion   Extension:  5 abnormal   Flexion:  110 abnormal      Tests   Soren:  Medial - negative Lateral - negative  Varus: negative Valgus: negative  Drawer:  Anterior - negative     Posterior - negative     Other   Erythema: absent  Sensation: normal  Pulse: present  Left knee swelling: Patient has marble sized fixed swelling patellar  tendon, nonmobile.  No tenderness with palpation or motion, no changes in sizes or appearance.     Comments:  Mild varus gait on L  Full ROM of distal joints with no sx aggravation  Distal motor and sensory intact, cap refill at 2 seconds.      IMAGE RESULTS:  XR knee 3 views bilateral  Narrative: Interpreted By:  Kris Carballo,   STUDY:  XR KNEE 3 VIEWS BILATERAL      INDICATION:  Signs/Symptoms:Pain.      COMPARISON:  None      ACCESSION NUMBER(S):  [id number]      ORDERING CLINICIAN:  RUFINO DENSON      FINDINGS:  Fairly advanced osteoarthritis bilateral knees particularly in the  medial compartments.      No fractures or lesions bilaterally.      Impression: Fairly advanced osteoarthritis bilateral knees particularly in the  medial compartments.      Signed by: Kris Carballo 11/2/2023 12:20 PM  Dictation workstation:   [id number]        L Inj/Asp: bilateral knee on 1/15/2024 12:20 PM  Indications: pain and joint swelling  Details: 22 G needle, ultrasound-guided superolateral approach  Medications (Right): 40 mg triamcinolone acetonide 40 mg/mL  Medications (Left): 40 mg triamcinolone acetonide 40 mg/mL  Outcome: tolerated well, no immediate complications    We discussed risk and benefits of cortisone injection, patient wishes to proceed via verbal consent.  Skin was prepped with Betadine, Vapocoolant spray and alcohol.  Direct visualization using high-frequency linear probe of ultrasound was utilized to administer the injection of 40 mg Kenalog, 3 cc 1% lidocaine and 3 cc of bupivacaine.  Patient tolerated injection well lidocaine suppression, applied to site postinjection.  Images were saved under MRN number into the PACS system.   Procedure, treatment alternatives, risks and benefits explained, specific risks discussed. Consent was given by the patient. Immediately prior to procedure a time out was called to verify the correct patient, procedure, equipment, support staff and site/side marked as required.  Patient was prepped and draped in the usual sterile fashion.

## 2024-01-16 ENCOUNTER — TELEPHONE (OUTPATIENT)
Dept: PRIMARY CARE | Facility: CLINIC | Age: 59
End: 2024-01-16
Payer: COMMERCIAL

## 2024-01-16 NOTE — TELEPHONE ENCOUNTER
Asking for a 2 week supply of Meloxicam. Was supposed to be sent in from Dr. Diego, but the pharmacy hasn't received it yet.

## 2024-01-17 ENCOUNTER — TELEPHONE (OUTPATIENT)
Dept: ORTHOPEDIC SURGERY | Facility: CLINIC | Age: 59
End: 2024-01-17
Payer: COMMERCIAL

## 2024-01-17 DIAGNOSIS — Z79.1 NSAID LONG-TERM USE: Primary | ICD-10-CM

## 2024-01-17 RX ORDER — MELOXICAM 15 MG/1
15 TABLET ORAL DAILY
Qty: 30 TABLET | Refills: 0 | Status: SHIPPED | OUTPATIENT
Start: 2024-01-17 | End: 2024-02-16

## 2024-01-17 NOTE — TELEPHONE ENCOUNTER
Patient called and stated that a 2 month RX of Meloxicam was supposed to be sent to his pharmacy yesterday 1/16/24 and it was not. So he called what he thought was our office but was actually Dr. Rand's office and left a voicemail. The PCP ended up calling the RX in for only one month. The patient would like to know if we can send the second month over or will he just need to wait until the RX from PCP is completed and then call here for a refill. Please advise and contact the patient.

## 2024-02-08 ENCOUNTER — OFFICE VISIT (OUTPATIENT)
Dept: ORTHOPEDIC SURGERY | Facility: CLINIC | Age: 59
End: 2024-02-08
Payer: COMMERCIAL

## 2024-02-08 DIAGNOSIS — M17.0 PRIMARY OSTEOARTHRITIS OF BOTH KNEES: ICD-10-CM

## 2024-02-08 PROCEDURE — 1036F TOBACCO NON-USER: CPT | Performed by: SPECIALIST

## 2024-02-08 PROCEDURE — 20611 DRAIN/INJ JOINT/BURSA W/US: CPT | Performed by: SPECIALIST

## 2024-02-08 PROCEDURE — 99214 OFFICE O/P EST MOD 30 MIN: CPT | Performed by: SPECIALIST

## 2024-02-08 RX ORDER — TRIAMCINOLONE ACETONIDE 40 MG/ML
2.5 INJECTION, SUSPENSION INTRA-ARTICULAR; INTRAMUSCULAR
Status: COMPLETED | OUTPATIENT
Start: 2024-02-08 | End: 2024-02-08

## 2024-02-08 RX ADMIN — TRIAMCINOLONE ACETONIDE 2.5 MG: 40 INJECTION, SUSPENSION INTRA-ARTICULAR; INTRAMUSCULAR at 12:28

## 2024-02-08 ASSESSMENT — PAIN SCALES - GENERAL: PAINLEVEL_OUTOF10: 2

## 2024-02-08 ASSESSMENT — PAIN DESCRIPTION - DESCRIPTORS: DESCRIPTORS: ACHING;SORE

## 2024-02-08 ASSESSMENT — PAIN - FUNCTIONAL ASSESSMENT: PAIN_FUNCTIONAL_ASSESSMENT: 0-10

## 2024-02-08 NOTE — ASSESSMENT & PLAN NOTE
Assessment: Bilateral knee arthritis varus.  Patient is still working as a  for the "[employer].  His job is very demanding and difficult to modify his activities.  He did say that he is no longer able to squat or kneel easily.    Plan:  Ultrasound-guided cortisone injections were performed bilaterally.  He had an excellent lidocaine suppression test.  Follow-up in 4 to 6 weeks for reevaluation.  Consider MRI scan if the cortisone injections do not give him reasonable relief.  I discussed the risks and benefits of total knee replacement with him.  He is not yet interested in pursuing knee replacement but was happy to begin the discussion about those risks and benefits.
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
## 2024-03-07 ENCOUNTER — OFFICE VISIT (OUTPATIENT)
Dept: ORTHOPEDIC SURGERY | Facility: CLINIC | Age: 59
End: 2024-03-07
Payer: COMMERCIAL

## 2024-03-07 ENCOUNTER — HOSPITAL ENCOUNTER (OUTPATIENT)
Dept: RADIOLOGY | Facility: CLINIC | Age: 59
Discharge: HOME | End: 2024-03-07
Payer: COMMERCIAL

## 2024-03-07 DIAGNOSIS — M17.0 PRIMARY OSTEOARTHRITIS OF BOTH KNEES: ICD-10-CM

## 2024-03-07 PROCEDURE — 99214 OFFICE O/P EST MOD 30 MIN: CPT | Performed by: SPECIALIST

## 2024-03-07 PROCEDURE — 76882 US LMTD JT/FCL EVL NVASC XTR: CPT | Performed by: SPECIALIST

## 2024-03-07 PROCEDURE — 1036F TOBACCO NON-USER: CPT | Performed by: SPECIALIST

## 2024-03-07 PROCEDURE — 73564 X-RAY EXAM KNEE 4 OR MORE: CPT | Mod: 50

## 2024-03-07 PROCEDURE — 73564 X-RAY EXAM KNEE 4 OR MORE: CPT | Mod: BILATERAL PROCEDURE | Performed by: RADIOLOGY

## 2024-03-07 RX ORDER — MELOXICAM 15 MG/1
15 TABLET ORAL DAILY
Qty: 30 TABLET | Refills: 11 | Status: SHIPPED | OUTPATIENT
Start: 2024-03-07 | End: 2025-03-07

## 2024-03-07 ASSESSMENT — PAIN - FUNCTIONAL ASSESSMENT: PAIN_FUNCTIONAL_ASSESSMENT: 0-10

## 2024-03-07 ASSESSMENT — PAIN SCALES - GENERAL: PAINLEVEL_OUTOF10: 5 - MODERATE PAIN

## 2024-03-07 NOTE — ASSESSMENT & PLAN NOTE
Assessment: Status post bilateral Kenalog injections for knee arthritis.  Patient states he got 75% improved.  He is pleased with the progress although he still does have pain in his knees.  We discussed alternative treatments but at this time he will continue with his current regime    Plan:  Mobic was refilled 30 tablets were given he will continue to take these daily with meals as needed pain.  Ultimately, if this becomes a more chronic medication he should have this refilled through his internist.  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  We talked about modifying or adjusting his work to try to accommodate his knees.  Follow-up in 3 months for reevaluation.  X-rays were reviewed with the patient.

## 2024-03-07 NOTE — PROGRESS NOTES
Assessment/Plan   Encounter Diagnoses:  Primary osteoarthritis of both knees  Primary osteoarthritis of both knees  Assessment: Status post bilateral Kenalog injections for knee arthritis.  Patient states he got 75% improved.  He is pleased with the progress although he still does have pain in his knees.  We discussed alternative treatments but at this time he will continue with his current regime    Plan:  Mobic was refilled 30 tablets were given he will continue to take these daily with meals as needed pain.  Ultimately, if this becomes a more chronic medication he should have this refilled through his internist.  He can continue his work as a  with the [employer] system.  We talked about modifying or adjusting his work to try to accommodate his knees.  Follow-up in 3 months for reevaluation.  X-rays were reviewed with the patient.       Subjective    Patient ID: Ricardo Newman is a 58 y.o. male.    Chief Complaint: Follow-up of the Right Knee (Had cortisone injection at last visit) and Follow-up of the Left Knee (Had cortisone injection at last visit)     Last Surgery: No surgery found  Last Surgery Date: No surgery found    HPI  58-year-old  at e|tab who states that he got about 75% improved from the cortisone injections given last visit.  He is pleased with his progress.  We did discuss other options but he feels the current regime is working.    OBJECTIVE: ORTHO EXAM    Right knee:  Skin healthy and intact  No gross swelling or ecchymosis  Alignment: Varus  Effusion: Scant  ROM: 0 degrees Extension   110 degrees Flexion  Minimal crepitance with range of motion  No pain with internal rotation of the hip  Tenderness to palpation: Medial     Mild laxity to valgus stress  Normal laxity to varus stress  Negative Lachman´s test  Negative posterior drawer test  Minimal pain with Soren´s test     Neurovascular exam normal distally  2+ DP pulse and good cap refill        Left knee:  Skin  healthy and intact  No gross swelling or ecchymosis-he has a large Osgood-Schlatter's at the tibial tubercle  Alignment: Varus  Effusion: Scant  ROM: 0 degrees Extension   120 degrees Flexion  Minimal crepitance with range of motion  No pain with internal rotation of the hip  Tenderness to palpation: Mild medial joint line     Mild laxity to valgus stress  Minimal laxity to varus stress  Negative Lachman´s test  Negative posterior drawer test  Minimal pain with Soren´s test     Neurovascular exam normal distally  2+ DP pulse and good cap refill  IMAGE RESULTS:  XR knee 3 views bilateral  Narrative: Interpreted By:  Kris Carballo,   STUDY:  XR KNEE 3 VIEWS BILATERAL      INDICATION:  Signs/Symptoms:Pain.      COMPARISON:  None      ACCESSION NUMBER(S):  [id number]      ORDERING CLINICIAN:  RUFINO DENSON      FINDINGS:  Fairly advanced osteoarthritis bilateral knees particularly in the  medial compartments.      No fractures or lesions bilaterally.      Impression: Fairly advanced osteoarthritis bilateral knees particularly in the  medial compartments.      Signed by: Kris Carballo 11/2/2023 12:20 PM  Dictation workstation:   [id number]      ULTRASOUND  DIAGNOSTIC ULTRASOUND REPORT FINAL: Right KNEE  Sonographer: Blaise Pina MD  Indication: Knee Pain  Procedure: Ultrasound, extremity, nonvascular, real-time, COMPLETE, anatomic specific  Technique: B-Mode Ultrasound Examination performed using 6- 9 MHz linear transducer with [employer]  STUDY TYPE:   1. ULTRASOUND EXTREMITY  2. REAL TIME WITH IMAGE DOCUMENTATION  3. NON-VASCULAR  4. COMPLETE STUDY, INCLUDING BUT NOT LIMITED TO MUSCLE, TENDONS, LIGAMENTS, SOFT TISSUES, ADIPOSE TISSUE AND SUBCUTANEOUS TISSUE.  Site: KNEE   Live ultrasound was performed with of patient's  KNEE and PERMANENTLY documented. I personally performed the ultrasound and reviewed the findings. These show:    Shayy-articular evaluation:   An intact Quadriceps Tendon with the Quadriceps  Muscle fibers showing normal striations Quadriceps Tendon demonstrating normal fibrillar pattern. . The Patellar Tendon demonstrates normal fibrillar pattern and is intact.   No significant soft tissue fluid collection/abscess appreciated.     Joint Evaluation:  The lateral joint line shows an intact LCL.   Medial joint line exam shows an intact MCL.   The patellar tendon was within normal limits.  Scant joint effusion noted.    DIAGNOSTIC ULTRASOUND REPORT FINAL: Left KNEE  Sonographer: Blaise Pina MD  Indication: Knee Pain  Procedure: Ultrasound, extremity, nonvascular, real-time, COMPLETE, anatomic specific  Technique: B-Mode Ultrasound Examination performed using 6- 9 MHz linear transducer with [employer] Software  STUDY TYPE:   1. ULTRASOUND EXTREMITY  2. REAL TIME WITH IMAGE DOCUMENTATION  3. NON-VASCULAR  4. COMPLETE STUDY, INCLUDING BUT NOT LIMITED TO MUSCLE, TENDONS, LIGAMENTS, SOFT TISSUES, ADIPOSE TISSUE AND SUBCUTANEOUS TISSUE.  Site: KNEE   Live ultrasound was performed with of patient's  KNEE and PERMANENTLY documented. I personally performed the ultrasound and reviewed the findings. These show:    Shayy-articular evaluation:   An intact Quadriceps Tendon with the Quadriceps Muscle fibers showing normal striations Quadriceps Tendon demonstrating normal fibrillar pattern. . The Patellar Tendon demonstrates normal fibrillar pattern and is intact.   No significant soft tissue fluid collection/abscess appreciated.     Joint Evaluation:  The lateral joint line shows an intact LCL.   Medial joint line exam shows an intact MCL.   The patellar tendon was within normal limits.  Scant joint effusion noted.     The patient tolerated the procedure well.         The patient tolerated the procedure well.        Procedures     Orders Placed This Encounter    XR knee 4+ views bilateral    Point of Care Ultrasound

## 2024-04-15 ENCOUNTER — OFFICE VISIT (OUTPATIENT)
Dept: ORTHOPEDIC SURGERY | Facility: CLINIC | Age: 59
End: 2024-04-15
Payer: COMMERCIAL

## 2024-04-15 ENCOUNTER — TELEPHONE (OUTPATIENT)
Dept: ORTHOPEDIC SURGERY | Facility: CLINIC | Age: 59
End: 2024-04-15

## 2024-04-15 DIAGNOSIS — M10.9 ACUTE GOUT OF RIGHT KNEE, UNSPECIFIED CAUSE: Primary | ICD-10-CM

## 2024-04-15 DIAGNOSIS — M17.0 PRIMARY OSTEOARTHRITIS OF BOTH KNEES: ICD-10-CM

## 2024-04-15 PROCEDURE — 99214 OFFICE O/P EST MOD 30 MIN: CPT | Performed by: NURSE PRACTITIONER

## 2024-04-15 RX ORDER — INDOMETHACIN 50 MG/1
50 CAPSULE ORAL
Qty: 10 CAPSULE | Refills: 1 | Status: SHIPPED | OUTPATIENT
Start: 2024-04-15 | End: 2024-07-14

## 2024-04-15 RX ORDER — COLCHICINE 0.6 MG/1
TABLET ORAL
Qty: 10 TABLET | Refills: 0 | Status: SHIPPED | OUTPATIENT
Start: 2024-04-15 | End: 2024-05-06 | Stop reason: SDUPTHER

## 2024-04-15 RX ORDER — PREDNISONE 20 MG/1
TABLET ORAL
Qty: 10 TABLET | Refills: 0 | Status: SHIPPED | OUTPATIENT
Start: 2024-04-15 | End: 2024-06-05 | Stop reason: WASHOUT

## 2024-04-15 ASSESSMENT — PAIN SCALES - GENERAL: PAINLEVEL_OUTOF10: 8

## 2024-04-15 ASSESSMENT — PAIN DESCRIPTION - DESCRIPTORS: DESCRIPTORS: THROBBING;ACHING

## 2024-04-15 ASSESSMENT — PAIN - FUNCTIONAL ASSESSMENT: PAIN_FUNCTIONAL_ASSESSMENT: 0-10

## 2024-04-17 PROBLEM — M10.9 ACUTE GOUT OF RIGHT KNEE: Status: ACTIVE | Noted: 2024-04-17

## 2024-04-17 ASSESSMENT — ENCOUNTER SYMPTOMS
NEUROLOGICAL NEGATIVE: 1
RESPIRATORY NEGATIVE: 1
ENDOCRINE NEGATIVE: 1
CONSTITUTIONAL NEGATIVE: 1
PSYCHIATRIC NEGATIVE: 1
HEMATOLOGIC/LYMPHATIC NEGATIVE: 1
ARTHRALGIAS: 1
CARDIOVASCULAR NEGATIVE: 1

## 2024-04-17 NOTE — ASSESSMENT & PLAN NOTE
Plan for acute gout flare to be controlled with 5-day prednisone burst, colchicine , and short-term course of indomethacin.  Patient has been instructed to not takepatient to continue to take Tylenol per package directions, diclofenac gel up to 4 times daily. Patient has been instructed to hold meloxicam during this acute phase of treatment.  Encouraged increased p.o. fluid intake of clear fluids, reviewed typical dietary triggers and stressed importance of p.o. hydration.  Offered work excuse, patient declines at this time. activities.  Reviewed rest, elevation, ice and activity modification.  Plan will be to follow-up here in approximately 6 weeks, sooner for changes or concerns.   This note was generated using Dragon software.  It may contain errors in wording, punctuation or spelling.

## 2024-04-17 NOTE — PROGRESS NOTES
Assessment/Plan     Encounter Diagnoses:  Problem List Items Addressed This Visit             ICD-10-CM    Primary osteoarthritis of both knees M17.0    Relevant Orders    Follow Up In Orthopaedic Surgery    Acute gout of right knee - Primary M10.9     Plan for acute gout flare to be controlled with 5-day prednisone burst, colchicine , and short-term course of indomethacin.  Patient has been instructed to not takepatient to continue to take Tylenol per package directions, diclofenac gel up to 4 times daily. Patient has been instructed to hold meloxicam during this acute phase of treatment.  Encouraged increased p.o. fluid intake of clear fluids, reviewed typical dietary triggers and stressed importance of p.o. hydration.  Offered work excuse, patient declines at this time. activities.  Reviewed rest, elevation, ice and activity modification.  Plan will be to follow-up here in approximately 6 weeks, sooner for changes or concerns.   This note was generated using Dragon software.  It may contain errors in wording, punctuation or spelling.         Relevant Medications    predniSONE (Deltasone) 20 mg tablet    colchicine 0.6 mg tablet    indomethacin (Indocin) 50 mg capsule    Other Relevant Orders    Follow Up In Orthopaedic Surgery           HPI:  Patient ID: Ricardo Newman is a 58 y.o. male.    Ricardo is a pleasant 58-year-old gentleman acut calderon chronic R knee pain, redness, swelling and concern for gout flare. Hx similar sx.   Patient with difficulty with range of motion, called off to work today because of increased symptoms since onset 2 days ago.  Denies any fever or chills.      Review of Systems   Constitutional: Negative.    HENT: Negative.     Respiratory: Negative.     Cardiovascular: Negative.    Endocrine: Negative.    Musculoskeletal:  Positive for arthralgias.   Skin: Negative.    Neurological: Negative.    Hematological: Negative.    Psychiatric/Behavioral: Negative.           OBJECTIVE: ORTHO EXAM  Right  Knee Exam      Tenderness   The patient is experiencing tenderness in the medial, lateral and patellofemoral joint line.     Range of Motion   Extension:  10  Flexion:  80 abnormal      Tests   deferred     Other   Erythema: mild  Sensation: normal  Pulse: present  Swelling: moderate       Comments:    Full ROM of distal joints with mild sx aggravation  Distal motor and sensory intact, cap refill at 2 seconds.          IMAGE RESULTS:  XR knee 4+ views bilateral  Narrative: Interpreted By:  Francia Mercado,   STUDY:  Bilateral knees, 4 views each.      INDICATION:  Signs/Symptoms:PAIN.      COMPARISON:  11/01/2023.      ACCESSION NUMBER(S):  [id number]      ORDERING CLINICIAN:  JENIFER CHÁVEZ      FINDINGS:  Severe bilateral medial compartment osteoarthrosis with joint space  loss. Mild bilateral lateral and patellar compartment osteoarthrosis  with osteophytes. No significant joint effusion bilaterally.      Impression: 1. Severe bilateral medial compartment osteoarthrosis      MACRO:  None.      Signed by: Francia Mercado 3/8/2024 7:34 PM  Dictation workstation:   [id number]

## 2024-05-06 DIAGNOSIS — M10.9 ACUTE GOUT OF RIGHT KNEE, UNSPECIFIED CAUSE: ICD-10-CM

## 2024-05-06 RX ORDER — COLCHICINE 0.6 MG/1
0.6 TABLET ORAL ONCE
Status: CANCELLED | OUTPATIENT
Start: 2024-05-06 | End: 2024-05-06

## 2024-05-06 RX ORDER — COLCHICINE 0.6 MG/1
TABLET ORAL
Qty: 10 TABLET | Refills: 0 | Status: SHIPPED | OUTPATIENT
Start: 2024-05-06

## 2024-06-05 ENCOUNTER — OFFICE VISIT (OUTPATIENT)
Dept: URGENT CARE | Facility: CLINIC | Age: 59
End: 2024-06-05
Payer: COMMERCIAL

## 2024-06-05 VITALS
SYSTOLIC BLOOD PRESSURE: 124 MMHG | OXYGEN SATURATION: 96 % | HEART RATE: 77 BPM | TEMPERATURE: 98.1 F | DIASTOLIC BLOOD PRESSURE: 84 MMHG | WEIGHT: 282 LBS | HEIGHT: 74 IN | BODY MASS INDEX: 36.19 KG/M2 | RESPIRATION RATE: 18 BRPM

## 2024-06-05 DIAGNOSIS — J06.9 URI WITH COUGH AND CONGESTION: Primary | ICD-10-CM

## 2024-06-05 PROCEDURE — 99212 OFFICE O/P EST SF 10 MIN: CPT | Performed by: PHYSICIAN ASSISTANT

## 2024-06-05 RX ORDER — DEXTROMETHORPHAN HYDROBROMIDE, GUAIFENESIN AND PSEUDOEPHEDRINE HYDROCHLORIDE 15; 400; 60 MG/1; MG/1; MG/1
1 TABLET ORAL 3 TIMES DAILY
Qty: 21 TABLET | Refills: 0 | Status: SHIPPED | OUTPATIENT
Start: 2024-06-05 | End: 2024-06-12

## 2024-06-05 NOTE — PROGRESS NOTES
[facility] URGENT CARE   INDIRA NOTE:      Name: Ricardo Newman, 58 y.o.    CSN:[id number]   MRN:[id number]    PCP: Julian Rand MD    ALL:    Allergies   Allergen Reactions    Sulfa (Sulfonamide Antibiotics) Unknown    Sulfamethoxazole-Trimethoprim Fever       History:    Chief Complaint: URI (Cough, fevers, chest congestion,body aches, fatigue X 4 days )    Encounter Date: 6/5/2024      HPI: The history was obtained from the patient. Ricardo is a 58 y.o. male, who presents with a chief complaint of URI (Cough, fevers, chest congestion,body aches, fatigue X 4 days ) he's had a temp (Tmax 101.5F), denies any exertional dyspnea, denies any mopped assist, mentions there is some production of sputum but nothing really colorful.  Has no significant nasal congestion but notable hoarse voice and fullness in the throat as well as a sore throat.  A nurse practitioner from previous clinic is prescribed him albuterol which she is attempted to use and is having some difficulty with coordination of the breath but feels like it is not useful, and he is also using or has a prescription for amoxicillin which he is yet to pursue.    PMHx:    Past Medical History:   Diagnosis Date    Gout     History of transfusion               Current Outpatient Medications   Medication Sig Dispense Refill    colchicine 0.6 mg tablet Take 1 tablet with a full glass of water, repeat dose in 1 hr. May take third dose later today if sx persist. Then 1 tab daily for 3 days. 10 tablet 0    Effexor  mg 24 hr capsule Take by mouth once daily.      Effexor XR 75 mg 24 hr capsule Take by mouth once daily.      indomethacin (Indocin) 50 mg capsule Take 1 capsule (50 mg) by mouth 2 times a day with meals. Take x 7 days after completing Prednisone 10 capsule 1    meloxicam (Mobic) 15 mg tablet Take 1 tablet (15 mg) by mouth once daily. 30 tablet 11    levothyroxine (Synthroid, Levoxyl) 137 mcg tablet Take 1 tablet (137 mcg) by mouth once daily.  90 tablet 3    lisinopriL-hydrochlorothiazide 20-25 mg tablet Take 2 tablets by mouth once daily. 180 tablet 3    pseudoephedrine-DM-guaifenesin (Capmist DM) 60- mg tablet Take 1 tablet by mouth 3 times a day for 7 days. 21 tablet 0     Current Facility-Administered Medications   Medication Dose Route Frequency Provider Last Rate Last Admin    meloxicam (Mobic) tablet 15 mg  15 mg oral Once Taylor Diego, APRN-CNP             PMSx:    Past Surgical History:   Procedure Laterality Date    OTHER SURGICAL HISTORY  12/23/2021    Knee surgery       Fam Hx:   Family History   Problem Relation Name Age of Onset    Hypertension Father         SOC. Hx:     Social History     Socioeconomic History    Marital status:      Spouse name: Not on file    Number of children: Not on file    Years of education: Not on file    Highest education level: Not on file   Occupational History    Not on file   Tobacco Use    Smoking status: Never    Smokeless tobacco: Never   Vaping Use    Vaping status: Never Used   Substance and Sexual Activity    Alcohol use: Yes    Drug use: Never    Sexual activity: Not on file   Other Topics Concern    Not on file   Social History Narrative    Not on file     Social Determinants of Health     Financial Resource Strain: Not on file   Food Insecurity: Not on file   Transportation Needs: Not on file   Physical Activity: Not on file   Stress: Not on file   Social Connections: Not on file   Intimate Partner Violence: Not on file   Housing Stability: Not on file         Vitals:    06/05/24 1405   BP: 124/84   Pulse: 77   Resp: 18   Temp: 36.7 °C (98.1 °F)   SpO2: 96%     128 kg (282 lb)          Physical Exam  Vitals reviewed.   Constitutional:       Appearance: Normal appearance. He is normal weight.   HENT:      Head: Normocephalic and atraumatic.      Nose: Mucosal edema present. No congestion (Patent airways).      Right Turbinates: Not enlarged or swollen.      Left Turbinates: Not  enlarged or swollen.      Mouth/Throat:      Lips: Pink.      Mouth: Mucous membranes are moist.   Eyes:      Extraocular Movements: Extraocular movements intact.   Neck:      Comments: Hoarse voice noted  Cardiovascular:      Rate and Rhythm: Normal rate and regular rhythm.   Pulmonary:      Effort: Pulmonary effort is normal.      Breath sounds: Normal breath sounds.      Comments: Rhonchi noted, but clears with coughing.  Abdominal:      General: Abdomen is flat.   Musculoskeletal:         General: Normal range of motion.      Cervical back: Normal range of motion and neck supple.      Comments: Noted some protuberances of the olecranon's as well as tibial tuberosities bilaterally.   Skin:     General: Skin is warm.   Neurological:      Mental Status: He is alert and oriented to person, place, and time.   Psychiatric:         Behavior: Behavior normal.         ____________________________________________________________________    I did personally review Ricardo's past medical history, surgical history, social history, as well as family history (when relevant).  In this case, I also oversaw the his drug management by reviewing his medication list, allergy list, as well as the medications that I prescribed during the UC course and/or recommended as an out-patient (including possible OTC medications such as acetaminophen, NSAIDs , etc).    After reviewing the items above, I did look at previous medical documentation, such as recent hospitalizations, office visits, and/or recent consultations with PCP/specialist.                          SDOH:   Another factor that I considered in Ricardo's care was his Social Determinants of Health (SDOH). During this UC encounter, he did have social determinants of health. Those SDOH influencing Ricardo's care are: none      _____________________________________________________________________      UC COURSE/MEDICAL DECISION MAKING:    Ricardo is a 58 y.o., who presents with a working  diagnosis of   1. URI with cough and congestion     with a differential to include: Influenza, parainfluenza, rhinovirus, adenovirus, metapneumovirus, coronavirus, COVID-19, postnasal drip, strep pharyngitis, GERD, retropharyngeal abscess, tonsillitis, adenitis, seasonal allergies    Patient has been prescribed albuterol, amoxicillin and a couple of other supportive regimens by a nurse practitioner from another clinic.  He is looking for advice and is concerned of possibility of RSV, since April we have had very low levels of RSV in the community and therefore no testing was pursued given the treatments may not be changed.  Patient was agreeable with this and We Discussed the Supportive Measures with CapMist, did also discussed the ways to use his albuterol if needed, recommend follow through with us or return to GP if symptoms persist or change she was agreeable.      Luis Fernando Muhammad PA-C   Advanced Practice Provider  [facility] URGENT CARE

## 2024-06-05 NOTE — LETTER
June 5, 2024     Patient: Ricardo Newman   YOB: 1965   Date of Visit: 6/5/2024       To Whom It May Concern:    It is my medical opinion that Ricardo Newman may return to work on 06/06/24 .    If you have any questions or concerns, please don't hesitate to call.         Sincerely,        Luis Fernando Muhammad PA-C    CC: No Recipients

## 2024-06-07 ENCOUNTER — APPOINTMENT (OUTPATIENT)
Dept: ORTHOPEDIC SURGERY | Facility: CLINIC | Age: 59
End: 2024-06-07
Payer: COMMERCIAL

## 2024-06-10 DIAGNOSIS — I10 PRIMARY HYPERTENSION: ICD-10-CM

## 2024-06-11 RX ORDER — LISINOPRIL AND HYDROCHLOROTHIAZIDE 20; 25 MG/1; MG/1
2 TABLET ORAL DAILY
Qty: 180 TABLET | Refills: 3 | OUTPATIENT
Start: 2024-06-11

## 2024-06-11 RX ORDER — ATENOLOL 100 MG/1
100 TABLET ORAL DAILY
Qty: 90 TABLET | Refills: 3 | OUTPATIENT
Start: 2024-06-11

## 2024-07-02 ENCOUNTER — APPOINTMENT (OUTPATIENT)
Dept: PRIMARY CARE | Facility: CLINIC | Age: 59
End: 2024-07-02
Payer: COMMERCIAL

## 2024-07-02 ENCOUNTER — LAB (OUTPATIENT)
Dept: LAB | Facility: LAB | Age: 59
End: 2024-07-02
Payer: COMMERCIAL

## 2024-07-02 VITALS
SYSTOLIC BLOOD PRESSURE: 130 MMHG | BODY MASS INDEX: 34.92 KG/M2 | OXYGEN SATURATION: 98 % | WEIGHT: 272 LBS | DIASTOLIC BLOOD PRESSURE: 100 MMHG | HEART RATE: 87 BPM

## 2024-07-02 DIAGNOSIS — I10 PRIMARY HYPERTENSION: ICD-10-CM

## 2024-07-02 DIAGNOSIS — Z00.00 HEALTHCARE MAINTENANCE: ICD-10-CM

## 2024-07-02 DIAGNOSIS — E03.9 ACQUIRED HYPOTHYROIDISM: ICD-10-CM

## 2024-07-02 DIAGNOSIS — M10.9 ACUTE GOUT OF RIGHT KNEE, UNSPECIFIED CAUSE: ICD-10-CM

## 2024-07-02 DIAGNOSIS — Z12.5 ENCOUNTER FOR SCREENING FOR MALIGNANT NEOPLASM OF PROSTATE: Primary | ICD-10-CM

## 2024-07-02 DIAGNOSIS — F41.1 GAD (GENERALIZED ANXIETY DISORDER): ICD-10-CM

## 2024-07-02 DIAGNOSIS — Z12.5 ENCOUNTER FOR SCREENING FOR MALIGNANT NEOPLASM OF PROSTATE: ICD-10-CM

## 2024-07-02 LAB
ANION GAP SERPL CALC-SCNC: 11 MMOL/L (ref 10–20)
BUN SERPL-MCNC: 14 MG/DL (ref 6–23)
CALCIUM SERPL-MCNC: 10.3 MG/DL (ref 8.6–10.3)
CHLORIDE SERPL-SCNC: 103 MMOL/L (ref 98–107)
CHOLEST SERPL-MCNC: 263 MG/DL (ref 0–199)
CHOLESTEROL/HDL RATIO: 4.4
CO2 SERPL-SCNC: 33 MMOL/L (ref 21–32)
CREAT SERPL-MCNC: 0.83 MG/DL (ref 0.5–1.3)
EGFRCR SERPLBLD CKD-EPI 2021: >90 ML/MIN/1.73M*2
ERYTHROCYTE [DISTWIDTH] IN BLOOD BY AUTOMATED COUNT: 13.8 % (ref 11.5–14.5)
GLUCOSE SERPL-MCNC: 101 MG/DL (ref 74–99)
HCT VFR BLD AUTO: 45.6 % (ref 41–52)
HDLC SERPL-MCNC: 60 MG/DL
HGB BLD-MCNC: 14 G/DL (ref 13.5–17.5)
LDLC SERPL CALC-MCNC: 165 MG/DL
MCH RBC QN AUTO: 29 PG (ref 26–34)
MCHC RBC AUTO-ENTMCNC: 30.7 G/DL (ref 32–36)
MCV RBC AUTO: 94 FL (ref 80–100)
NON HDL CHOLESTEROL: 203 MG/DL (ref 0–149)
NRBC BLD-RTO: 0 /100 WBCS (ref 0–0)
PLATELET # BLD AUTO: 295 X10*3/UL (ref 150–450)
POTASSIUM SERPL-SCNC: 5.8 MMOL/L (ref 3.5–5.3)
PSA SERPL-MCNC: 2.49 NG/ML
RBC # BLD AUTO: 4.83 X10*6/UL (ref 4.5–5.9)
SODIUM SERPL-SCNC: 141 MMOL/L (ref 136–145)
T4 FREE SERPL-MCNC: 0.99 NG/DL (ref 0.61–1.12)
TRIGL SERPL-MCNC: 190 MG/DL (ref 0–149)
TSH SERPL-ACNC: 5.3 MIU/L (ref 0.44–3.98)
VLDL: 38 MG/DL (ref 0–40)
WBC # BLD AUTO: 7.8 X10*3/UL (ref 4.4–11.3)

## 2024-07-02 PROCEDURE — 84153 ASSAY OF PSA TOTAL: CPT

## 2024-07-02 PROCEDURE — 3075F SYST BP GE 130 - 139MM HG: CPT | Performed by: FAMILY MEDICINE

## 2024-07-02 PROCEDURE — 85027 COMPLETE CBC AUTOMATED: CPT

## 2024-07-02 PROCEDURE — 84439 ASSAY OF FREE THYROXINE: CPT

## 2024-07-02 PROCEDURE — 84443 ASSAY THYROID STIM HORMONE: CPT

## 2024-07-02 PROCEDURE — 80061 LIPID PANEL: CPT

## 2024-07-02 PROCEDURE — 36415 COLL VENOUS BLD VENIPUNCTURE: CPT

## 2024-07-02 PROCEDURE — 99214 OFFICE O/P EST MOD 30 MIN: CPT | Performed by: FAMILY MEDICINE

## 2024-07-02 PROCEDURE — 80048 BASIC METABOLIC PNL TOTAL CA: CPT

## 2024-07-02 PROCEDURE — 3080F DIAST BP >= 90 MM HG: CPT | Performed by: FAMILY MEDICINE

## 2024-07-02 PROCEDURE — 1036F TOBACCO NON-USER: CPT | Performed by: FAMILY MEDICINE

## 2024-07-02 RX ORDER — LISINOPRIL AND HYDROCHLOROTHIAZIDE 20; 25 MG/1; MG/1
2 TABLET ORAL DAILY
Qty: 180 TABLET | Refills: 3 | Status: SHIPPED | OUTPATIENT
Start: 2024-07-02 | End: 2025-07-02

## 2024-07-02 RX ORDER — LEVOTHYROXINE SODIUM 137 UG/1
137 TABLET ORAL DAILY
Qty: 90 TABLET | Refills: 3 | Status: SHIPPED | OUTPATIENT
Start: 2024-07-02 | End: 2025-07-02

## 2024-07-02 RX ORDER — INDOMETHACIN 50 MG/1
50 CAPSULE ORAL
Qty: 30 CAPSULE | Refills: 1 | Status: SHIPPED | OUTPATIENT
Start: 2024-07-02 | End: 2024-09-30

## 2024-07-02 RX ORDER — COLCHICINE 0.6 MG/1
TABLET ORAL
Qty: 30 TABLET | Refills: 1 | Status: SHIPPED | OUTPATIENT
Start: 2024-07-02

## 2024-07-02 RX ORDER — VENLAFAXINE HYDROCHLORIDE 75 MG/1
225 CAPSULE, EXTENDED RELEASE ORAL
Qty: 90 CAPSULE | Refills: 1 | Status: SHIPPED | OUTPATIENT
Start: 2024-07-02 | End: 2024-07-03 | Stop reason: SDUPTHER

## 2024-07-02 NOTE — PROGRESS NOTES
Subjective   Patient ID: Ricardo Newman is a 58 y.o. male who presents for Med check (Patient states he is taking atenolol, but it's not listed in his current med list. Stating he doesn't want to continue seeing the counselor to get the Effexor prescribed. Asking if Dr. Rand will take over filling it. ).    HPI       Hope 419  x 10 years   Effexor  treating for SLADE   Sees every 3 months   Has been on the 225 mg dose for 10 years       Htn      States 130/80 -90  last checked 1 momth ago      Home bp cuff upper arm      No cp no palp no HA     Hypothyroid x 10+ years      No brittle nails or cold extr         ADRIAN knee OA    Has been over a year since last knee injection     Sees ortho now         Gout foot either side    Indocin works better than  colchicine    Dr Pina will get knee replacements in the next year        .     Review of Systems   Cardiovascular:  Negative for chest pain.   Neurological:  Negative for dizziness, light-headedness and headaches.     Review of Systems    Objective   BP (!) 130/100 (BP Location: Left arm, Patient Position: Sitting)   Pulse 87   Wt 123 kg (272 lb)   SpO2 98%   BMI 34.92 kg/m²     Physical Exam  Constitutional:       Appearance: Normal appearance.   HENT:      Head: Normocephalic and atraumatic.   Eyes:      Conjunctiva/sclera: Conjunctivae normal.      Pupils: Pupils are equal, round, and reactive to light.   Cardiovascular:      Rate and Rhythm: Normal rate and regular rhythm.      Heart sounds: Normal heart sounds.   Pulmonary:      Effort: Pulmonary effort is normal.      Breath sounds: Normal breath sounds.   Lymphadenopathy:      Cervical: No cervical adenopathy.   Skin:     Coloration: Skin is not jaundiced.   Neurological:      General: No focal deficit present.      Mental Status: He is alert and oriented to person, place, and time.   Psychiatric:         Mood and Affect: Mood normal.         Behavior: Behavior normal.         Thought Content: Thought content  normal.         Judgment: Judgment normal.         Assessment/Plan   Diagnoses and all orders for this visit:  Encounter for screening for malignant neoplasm of prostate  -     Prostate Specific Antigen, Screen; Future  Acquired hypothyroidism  -     levothyroxine (Synthroid, Levoxyl) 137 mcg tablet; Take 1 tablet (137 mcg) by mouth once daily.  -     TSH with reflex to Free T4 if abnormal; Future  Primary hypertension  -     lisinopriL-hydrochlorothiazide 20-25 mg tablet; Take 2 tablets by mouth once daily.  -     Basic Metabolic Panel; Future  -     Lipid Panel; Future  -     CBC; Future  Healthcare maintenance  -     Prostate Specific Antigen, Screen; Future  SLADE (generalized anxiety disorder)  -     Effexor XR 75 mg 24 hr capsule; Take 3 capsules (225 mg) by mouth once daily.  Acute gout of right knee, unspecified cause  -     colchicine 0.6 mg tablet; Take 1 tablet with a full glass of water, repeat dose in 1 hr. May take third dose later today if sx persist. Then 1 tab daily for 3 days.  -     indomethacin (Indocin) 50 mg capsule; Take 1 capsule (50 mg) by mouth 2 times daily (morning and late afternoon). Take x 7 days after completing Prednisone

## 2024-07-03 ENCOUNTER — TELEPHONE (OUTPATIENT)
Dept: PRIMARY CARE | Facility: CLINIC | Age: 59
End: 2024-07-03
Payer: COMMERCIAL

## 2024-07-03 DIAGNOSIS — F41.1 GAD (GENERALIZED ANXIETY DISORDER): ICD-10-CM

## 2024-07-03 RX ORDER — VENLAFAXINE HYDROCHLORIDE 150 MG/1
150 CAPSULE, EXTENDED RELEASE ORAL DAILY
Qty: 90 CAPSULE | Refills: 1 | Status: SHIPPED | OUTPATIENT
Start: 2024-07-03 | End: 2024-07-05 | Stop reason: SDUPTHER

## 2024-07-03 RX ORDER — VENLAFAXINE HYDROCHLORIDE 75 MG/1
CAPSULE, EXTENDED RELEASE ORAL
Refills: 1 | OUTPATIENT
Start: 2024-07-03

## 2024-07-03 RX ORDER — VENLAFAXINE HYDROCHLORIDE 75 MG/1
75 CAPSULE, EXTENDED RELEASE ORAL
Qty: 90 CAPSULE | Refills: 1 | Status: SHIPPED | OUTPATIENT
Start: 2024-07-03 | End: 2024-07-05 | Stop reason: SDUPTHER

## 2024-07-03 NOTE — TELEPHONE ENCOUNTER
Patient wife called in and said that insurance wont approve the 3 tabs a day of Effexor. They will only approve the 150mg once a day tab and 75 mg once a day tab. [facility] pharmacy

## 2024-07-05 DIAGNOSIS — M10.9 ACUTE GOUT OF RIGHT KNEE, UNSPECIFIED CAUSE: ICD-10-CM

## 2024-07-05 DIAGNOSIS — E03.9 ACQUIRED HYPOTHYROIDISM: ICD-10-CM

## 2024-07-05 DIAGNOSIS — F41.1 GAD (GENERALIZED ANXIETY DISORDER): ICD-10-CM

## 2024-07-05 RX ORDER — VENLAFAXINE HYDROCHLORIDE 75 MG/1
75 CAPSULE, EXTENDED RELEASE ORAL
Qty: 90 CAPSULE | Refills: 3 | Status: SHIPPED | OUTPATIENT
Start: 2024-07-05 | End: 2025-07-05

## 2024-07-05 RX ORDER — VENLAFAXINE HYDROCHLORIDE 150 MG/1
150 CAPSULE, EXTENDED RELEASE ORAL DAILY
Qty: 90 CAPSULE | Refills: 3 | Status: SHIPPED | OUTPATIENT
Start: 2024-07-05 | End: 2025-07-05

## 2024-07-05 NOTE — TELEPHONE ENCOUNTER
THE PHARMACY WANTS TO KNOW IF YOU WANT TO CANCEL THE EFFEXOR ON 7/2/2024. THEIR PHONE NUMBER IS [phone] REFERENCE NUMBER IS [id number]. THANK YOU.

## 2024-07-08 NOTE — TELEPHONE ENCOUNTER
Morelia, Pharmacy Tech @ [facility] a script req for Effexor, member plan is not covering, have HX of generic, please call to discuss. Ref# [id number]

## 2024-07-08 NOTE — TELEPHONE ENCOUNTER
Detailed message was left on pt vm - fax was also received stating they would cover duloxetine not effexor,  I let pt  know this would be addressed on Thursday

## 2024-07-11 NOTE — TELEPHONE ENCOUNTER
Pt is on 225 total.  initially I change to a   75 mg x 3 = 225 but pharmacy did not like this .  They wanted 150 + 75 =225 so I changed it.   See what does pharmacy need exactly. Then send rx proposal.

## 2024-07-15 DIAGNOSIS — I10 PRIMARY HYPERTENSION: Primary | ICD-10-CM

## 2024-07-16 RX ORDER — COLCHICINE 0.6 MG/1
TABLET ORAL
Qty: 30 TABLET | Refills: 1 | Status: SHIPPED | OUTPATIENT
Start: 2024-07-16

## 2024-07-16 RX ORDER — INDOMETHACIN 50 MG/1
50 CAPSULE ORAL
Qty: 30 CAPSULE | Refills: 1 | Status: SHIPPED | OUTPATIENT
Start: 2024-07-16 | End: 2024-10-14

## 2024-07-16 RX ORDER — LEVOTHYROXINE SODIUM 137 UG/1
137 TABLET ORAL DAILY
Qty: 90 TABLET | Refills: 3 | Status: SHIPPED | OUTPATIENT
Start: 2024-07-16 | End: 2025-07-16

## 2024-07-16 RX ORDER — VENLAFAXINE HYDROCHLORIDE 75 MG/1
75 CAPSULE, EXTENDED RELEASE ORAL DAILY
Qty: 30 CAPSULE | Refills: 5 | Status: SHIPPED | OUTPATIENT
Start: 2024-07-16

## 2024-07-16 NOTE — TELEPHONE ENCOUNTER
CORRECTION:    Please call in to DM  colchicine 0.6 mg tablet , indomethacin (Indocin) 50 mg capsule     Please send to [facility]  levothyroxine (Synthroid, Levoxyl) 137 mcg tablet

## 2024-07-16 NOTE — TELEPHONE ENCOUNTER
Patient wife called in and would like a refill on the following medication.....colchicine 0.6 mg tablet , indomethacin (Indocin) 50 mg capsule ,levothyroxine (Synthroid, Levoxyl) 137 mcg tablet called into [facility]

## 2024-07-24 ENCOUNTER — TELEPHONE (OUTPATIENT)
Dept: PRIMARY CARE | Facility: CLINIC | Age: 59
End: 2024-07-24
Payer: COMMERCIAL

## 2024-07-24 DIAGNOSIS — I10 PRIMARY HYPERTENSION: ICD-10-CM

## 2024-07-24 RX ORDER — LISINOPRIL AND HYDROCHLOROTHIAZIDE 20; 25 MG/1; MG/1
2 TABLET ORAL DAILY
Qty: 180 TABLET | Refills: 1 | Status: SHIPPED | OUTPATIENT
Start: 2024-07-24

## 2024-07-24 NOTE — TELEPHONE ENCOUNTER
WIFE CALLED TO SAY THAT ES NEEDS HIS BLOOD PRESSURE PILLS. SHE SAID HE IS OUT OF PILLS, [facility]. THANK YOU. SHE WOULD LIKE A CALL WHEN THIS IS SENT IN.

## 2024-07-25 ENCOUNTER — TELEPHONE (OUTPATIENT)
Dept: PRIMARY CARE | Facility: CLINIC | Age: 59
End: 2024-07-25
Payer: COMMERCIAL

## 2024-07-25 DIAGNOSIS — I10 PRIMARY HYPERTENSION: Primary | ICD-10-CM

## 2024-07-25 RX ORDER — ATENOLOL 50 MG/1
50 TABLET ORAL DAILY
Qty: 90 TABLET | Refills: 1 | Status: SHIPPED | OUTPATIENT
Start: 2024-07-25 | End: 2024-07-26 | Stop reason: SDUPTHER

## 2024-07-25 NOTE — TELEPHONE ENCOUNTER
Called the patient for clarification on Atenolol.  He states he is still taking it and thought the dose was 50 mg.  The chart history reveals Tenormin 100 mg one tablet daily that was discontinued by provider in urgent care on 6/5/24.  Patient requesting refill.

## 2024-07-25 NOTE — TELEPHONE ENCOUNTER
SHE CALLED TO SAY HE NEEDS A WEEK SUPPLY SENT TO [facility] ASAP HE IS OUT THEN A 3 MTH SENT TO [facility]. ANY QUESTIONS YOU CAN CALL WIFE [phone]. THANK YOU.

## 2024-07-26 ENCOUNTER — TELEPHONE (OUTPATIENT)
Dept: PRIMARY CARE | Facility: CLINIC | Age: 59
End: 2024-07-26
Payer: COMMERCIAL

## 2024-07-26 DIAGNOSIS — I10 PRIMARY HYPERTENSION: Primary | ICD-10-CM

## 2024-07-26 RX ORDER — ATENOLOL 50 MG/1
50 TABLET ORAL DAILY
Qty: 90 TABLET | Refills: 3 | Status: SHIPPED | OUTPATIENT
Start: 2024-07-26 | End: 2025-07-26

## 2024-07-26 NOTE — TELEPHONE ENCOUNTER
atenolol (Tenormin) 50 mg tablet [[id number]]    Order Details  Dose: 50 mg Route: oral Frequency: Daily   Dispense Quantity: 90 tablet Refills: 1          Sig: Take 1 tablet (50 mg) by mouth once daily.   [facility]. THANK YOU.

## 2024-08-01 ENCOUNTER — TELEPHONE (OUTPATIENT)
Dept: PRIMARY CARE | Facility: CLINIC | Age: 59
End: 2024-08-01
Payer: COMMERCIAL

## 2024-08-01 NOTE — TELEPHONE ENCOUNTER
Patient is asking for clarification on his atenolol dosage?  Looks like he was on 50 mg and was refilled in July.  Please advise.

## 2024-08-01 NOTE — TELEPHONE ENCOUNTER
08/01/24 Patient needs clarification about the dosage of his atenolol 50 mg tablet    Ph: [phone]

## 2024-08-22 DIAGNOSIS — M17.0 PRIMARY OSTEOARTHRITIS OF BOTH KNEES: ICD-10-CM

## 2024-08-23 ENCOUNTER — HOSPITAL ENCOUNTER (OUTPATIENT)
Dept: RADIOLOGY | Facility: CLINIC | Age: 59
Discharge: HOME | End: 2024-08-23
Payer: COMMERCIAL

## 2024-08-23 ENCOUNTER — APPOINTMENT (OUTPATIENT)
Dept: ORTHOPEDIC SURGERY | Facility: CLINIC | Age: 59
End: 2024-08-23
Payer: COMMERCIAL

## 2024-08-23 ENCOUNTER — HOSPITAL ENCOUNTER (OUTPATIENT)
Dept: RADIOLOGY | Facility: EXTERNAL LOCATION | Age: 59
Discharge: HOME | End: 2024-08-23

## 2024-08-23 DIAGNOSIS — M17.0 PRIMARY OSTEOARTHRITIS OF BOTH KNEES: ICD-10-CM

## 2024-08-23 PROCEDURE — 73564 X-RAY EXAM KNEE 4 OR MORE: CPT | Mod: 50

## 2024-08-23 RX ORDER — TRIAMCINOLONE ACETONIDE 40 MG/ML
40 INJECTION, SUSPENSION INTRA-ARTICULAR; INTRAMUSCULAR
Status: COMPLETED | OUTPATIENT
Start: 2024-08-23 | End: 2024-08-23

## 2024-08-23 ASSESSMENT — PAIN - FUNCTIONAL ASSESSMENT: PAIN_FUNCTIONAL_ASSESSMENT: 0-10

## 2024-08-23 ASSESSMENT — PAIN SCALES - GENERAL: PAINLEVEL_OUTOF10: 8

## 2024-08-23 ASSESSMENT — PAIN DESCRIPTION - DESCRIPTORS: DESCRIPTORS: SHARP;SHOOTING;DULL;ACHING

## 2024-08-23 NOTE — ASSESSMENT & PLAN NOTE
Assessment: Bilateral osteoarthritis of the knees.    Plan:  Under ultrasound control cortisone injections of both knees were performed.  He had excellent lidocaine suppression test.  Follow-up in 3 months for reevaluation.  We discussed the risks and benefits of other treatment options including total knee arthroplasty.

## 2024-08-23 NOTE — PROGRESS NOTES
Assessment/Plan   Encounter Diagnoses:  Primary osteoarthritis of both knees  Primary osteoarthritis of both knees  Assessment: Bilateral osteoarthritis of the knees.    Plan:  Under ultrasound control cortisone injections of both knees were performed.  He had excellent lidocaine suppression test.  Follow-up in 3 months for reevaluation.  We discussed the risks and benefits of other treatment options including total knee arthroplasty.       Subjective    Patient ID: Ricardo Newman is a 58 y.o. male.    Chief Complaint: Follow-up and Pain of the Right Knee (Patient is here requesting cortisone injections, his last injection was in 02/2024.) and Follow-up and Pain of the Left Knee (Patient is here requesting cortisone injections, his last injection was in 02/2024.)     Last Surgery: No surgery found  Last Surgery Date: No surgery found    HPI  58-year-old  at [place] who states that he got about 75% improved from the cortisone injections given last visit.  He is pleased with his progress.  We did discuss other options but he feels the current regime is working.    OBJECTIVE: ORTHO EXAM    Right knee:  Skin healthy and intact  No gross swelling or ecchymosis  Alignment: Varus  Effusion: Scant  ROM: 0 degrees Extension   110 degrees Flexion  Minimal crepitance with range of motion  No pain with internal rotation of the hip  Tenderness to palpation: Medial     Mild laxity to valgus stress  Normal laxity to varus stress  Negative Lachman´s test  Negative posterior drawer test  Minimal pain with Soren´s test     Neurovascular exam normal distally  2+ DP pulse and good cap refill        Left knee:  Skin healthy and intact  No gross swelling or ecchymosis-he has a large Osgood-Schlatter's at the tibial tubercle  Alignment: Varus  Effusion: Scant  ROM: 0 degrees Extension   120 degrees Flexion  Minimal crepitance with range of motion  No pain with internal rotation of the hip  Tenderness to palpation:  Mild medial joint line     Mild laxity to valgus stress  Minimal laxity to varus stress  Negative Lachman´s test  Negative posterior drawer test  Minimal pain with Soren´s test     Neurovascular exam normal distally  2+ DP pulse and good cap refill  IMAGE RESULTS:  Point of Care Ultrasound  These images are not reportable by radiology and will not be interpreted   by  Radiologists.      ULTRASOUND  DIAGNOSTIC ULTRASOUND REPORT FINAL: Right KNEE  Sonographer: Blaise Pina MD  Indication: Knee Pain  Procedure: Ultrasound, extremity, nonvascular, real-time, COMPLETE, anatomic specific  Technique: B-Mode Ultrasound Examination performed using 6- 9 MHz linear transducer with [employer] Solution Software  STUDY TYPE:   1. ULTRASOUND EXTREMITY  2. REAL TIME WITH IMAGE DOCUMENTATION  3. NON-VASCULAR  4. COMPLETE STUDY, INCLUDING BUT NOT LIMITED TO MUSCLE, TENDONS, LIGAMENTS, SOFT TISSUES, ADIPOSE TISSUE AND SUBCUTANEOUS TISSUE.  Site: KNEE   Live ultrasound was performed with of patient's  KNEE and PERMANENTLY documented. I personally performed the ultrasound and reviewed the findings. These show:    Shayy-articular evaluation:   An intact Quadriceps Tendon with the Quadriceps Muscle fibers showing normal striations Quadriceps Tendon demonstrating normal fibrillar pattern. . The Patellar Tendon demonstrates normal fibrillar pattern and is intact.   No significant soft tissue fluid collection/abscess appreciated.     Joint Evaluation:  The lateral joint line shows an intact LCL.   Medial joint line exam shows an intact MCL.   The patellar tendon was within normal limits.  Scant joint effusion noted.    DIAGNOSTIC ULTRASOUND REPORT FINAL: Left KNEE  Sonographer: Blaise Pina MD  Indication: Knee Pain  Procedure: Ultrasound, extremity, nonvascular, real-time, COMPLETE, anatomic specific  Technique: B-Mode Ultrasound Examination performed using 6- 9 MHz linear transducer with [employer] Solution Software  STUDY TYPE:   1. ULTRASOUND EXTREMITY  2. REAL  TIME WITH IMAGE DOCUMENTATION  3. NON-VASCULAR  4. COMPLETE STUDY, INCLUDING BUT NOT LIMITED TO MUSCLE, TENDONS, LIGAMENTS, SOFT TISSUES, ADIPOSE TISSUE AND SUBCUTANEOUS TISSUE.  Site: KNEE   Live ultrasound was performed with of patient's  KNEE and PERMANENTLY documented. I personally performed the ultrasound and reviewed the findings. These show:    Shayy-articular evaluation:   An intact Quadriceps Tendon with the Quadriceps Muscle fibers showing normal striations Quadriceps Tendon demonstrating normal fibrillar pattern. . The Patellar Tendon demonstrates normal fibrillar pattern and is intact.   No significant soft tissue fluid collection/abscess appreciated.     Joint Evaluation:  The lateral joint line shows an intact LCL.   Medial joint line exam shows an intact MCL.   The patellar tendon was within normal limits.  Scant joint effusion noted.     The patient tolerated the procedure well.         The patient tolerated the procedure well.        L Inj/Asp: bilateral knee on 8/23/2024 2:13 PM  Details: ultrasound-guided superolateral approach  Medications (Right): 40 mg triamcinolone acetonide 40 mg/mL  Medications (Left): 40 mg triamcinolone acetonide 40 mg/mL  Procedure, treatment alternatives, risks and benefits explained, specific risks discussed. Immediately prior to procedure a time out was called to verify the correct patient, procedure, equipment, support staff and site/side marked as required. Patient was prepped and draped in the usual sterile fashion.            Orders Placed This Encounter    Point of Care Ultrasound    Point of Care Ultrasound

## 2024-11-05 DIAGNOSIS — I10 PRIMARY HYPERTENSION: ICD-10-CM

## 2024-11-05 RX ORDER — ATENOLOL 50 MG/1
50 TABLET ORAL DAILY
Qty: 90 TABLET | Refills: 2 | Status: SHIPPED | OUTPATIENT
Start: 2024-11-05 | End: 2025-11-05

## 2024-11-21 ENCOUNTER — APPOINTMENT (OUTPATIENT)
Dept: ORTHOPEDIC SURGERY | Facility: CLINIC | Age: 59
End: 2024-11-21
Payer: COMMERCIAL

## 2024-11-21 ENCOUNTER — HOSPITAL ENCOUNTER (OUTPATIENT)
Dept: RADIOLOGY | Facility: EXTERNAL LOCATION | Age: 59
Discharge: HOME | End: 2024-11-21

## 2024-11-21 DIAGNOSIS — M17.0 PRIMARY OSTEOARTHRITIS OF BOTH KNEES: ICD-10-CM

## 2024-11-21 RX ORDER — TRIAMCINOLONE ACETONIDE 40 MG/ML
2.5 INJECTION, SUSPENSION INTRA-ARTICULAR; INTRAMUSCULAR
Status: COMPLETED | OUTPATIENT
Start: 2024-11-21 | End: 2024-11-21

## 2024-11-21 ASSESSMENT — PAIN DESCRIPTION - DESCRIPTORS: DESCRIPTORS: ACHING;BURNING;SHARP;SHOOTING;THROBBING

## 2024-11-21 ASSESSMENT — PAIN SCALES - GENERAL: PAINLEVEL_OUTOF10: 8

## 2024-11-21 ASSESSMENT — PAIN - FUNCTIONAL ASSESSMENT: PAIN_FUNCTIONAL_ASSESSMENT: 0-10

## 2024-11-21 NOTE — ASSESSMENT & PLAN NOTE
Assessment: 12 weeks and 6 days status post 8/23/2024 bilateral cortisone injections.  Patient comes in today stating that he was doing quite well for most of the time until the last several weeks when his knees started to begin to be symptomatic.    Plan:  Under ultrasound control 40 mg of Kenalog were injected into each knee.  He had an excellent lidocaine suppression test bilaterally.  Follow-up in 3 months for reevaluation.

## 2024-11-21 NOTE — PROGRESS NOTES
Assessment/Plan   Encounter Diagnoses:  Primary osteoarthritis of both knees  Primary osteoarthritis of both knees  Assessment: 12 weeks and 6 days status post 8/23/2024 bilateral cortisone injections.  Patient comes in today stating that he was doing quite well for most of the time until the last several weeks when his knees started to begin to be symptomatic.    Plan:  Under ultrasound control 40 mg of Kenalog were injected into each knee.  He had an excellent lidocaine suppression test bilaterally.  Follow-up in 3 months for reevaluation.       Subjective    Patient ID: Ricardo Newman is a 59 y.o. male.    Chief Complaint: Follow-up and Pain of the Right Knee (Patient is here requesting cortisone injections, his last injection was in 8-23-24/PAIN RATE 7/X-RAYS 8-23-24) and Follow-up and Pain of the Left Knee (Patient is here requesting cortisone injections, his last injection was in 8-23-24/PAIN RATE  8/X-RAYS 8-23-24)     Last Surgery: No surgery found  Last Surgery Date: No surgery found    HPI  58-year-old  at [employer] who states that he got about 75% improved from the cortisone injections given last visit.  He is pleased with his progress.  We did discuss other options but he feels the current regime is working.    11/21/20245555-39-geom-old  at [employer] comes in today stating that his knees are starting to become more symptomatic again.  He is over 12 weeks status post his last cortisone injections.  Viscosupplementation's were not as helpful.  OBJECTIVE: ORTHO EXAM    Right knee:  Skin healthy and intact  No gross swelling or ecchymosis  Alignment: Varus  Effusion: Scant  ROM: 0 degrees Extension   110 degrees Flexion  Minimal crepitance with range of motion  No pain with internal rotation of the hip  Tenderness to palpation: Medial he also complained of some achy pain in the proximal tibia just distal to the tibial tubercle.     Mild laxity to valgus  stress  Normal laxity to varus stress  Negative Lachman´s test  Negative posterior drawer test  Minimal pain with Soren´s test     Neurovascular exam normal distally  2+ DP pulse and good cap refill        Left knee:  Skin healthy and intact  No gross swelling or ecchymosis-he has a large Osgood-Schlatter's at the tibial tubercle  Alignment: Varus  Effusion: Scant  ROM: 0 degrees Extension   120 degrees Flexion  Minimal crepitance with range of motion  No pain with internal rotation of the hip  Tenderness to palpation: Mild medial joint line     Mild laxity to valgus stress  Minimal laxity to varus stress  Negative Lachman´s test  Negative posterior drawer test  Minimal pain with Soren´s test     Neurovascular exam normal distally  2+ DP pulse and good cap refill  IMAGE RESULTS:  Point of Care Ultrasound  These images are not reportable by radiology and will not be interpreted   by  Radiologists.      ULTRASOUND  DIAGNOSTIC ULTRASOUND REPORT FINAL: Right KNEE  Sonographer: Blaise Pina MD  Indication: Knee Pain  Procedure: Ultrasound, extremity, nonvascular, real-time, COMPLETE, anatomic specific  Technique: B-Mode Ultrasound Examination performed using 6- 9 MHz linear transducer with [employer] Software  STUDY TYPE:   1. ULTRASOUND EXTREMITY  2. REAL TIME WITH IMAGE DOCUMENTATION  3. NON-VASCULAR  4. COMPLETE STUDY, INCLUDING BUT NOT LIMITED TO MUSCLE, TENDONS, LIGAMENTS, SOFT TISSUES, ADIPOSE TISSUE AND SUBCUTANEOUS TISSUE.  Site: KNEE   Live ultrasound was performed with of patient's  KNEE and PERMANENTLY documented. I personally performed the ultrasound and reviewed the findings. These show:    Shayy-articular evaluation:   An intact Quadriceps Tendon with the Quadriceps Muscle fibers showing normal striations Quadriceps Tendon demonstrating normal fibrillar pattern. . The Patellar Tendon demonstrates normal fibrillar pattern and is intact.   No significant soft tissue fluid collection/abscess appreciated.      Joint Evaluation:  The lateral joint line shows an intact LCL.   Medial joint line exam shows an intact MCL.   The patellar tendon was within normal limits.  Scant joint effusion noted.    DIAGNOSTIC ULTRASOUND REPORT FINAL: Left KNEE  Sonographer: Blaise Pina MD  Indication: Knee Pain  Procedure: Ultrasound, extremity, nonvascular, real-time, COMPLETE, anatomic specific  Technique: B-Mode Ultrasound Examination performed using 6- 9 MHz linear transducer with [employer]  STUDY TYPE:   1. ULTRASOUND EXTREMITY  2. REAL TIME WITH IMAGE DOCUMENTATION  3. NON-VASCULAR  4. COMPLETE STUDY, INCLUDING BUT NOT LIMITED TO MUSCLE, TENDONS, LIGAMENTS, SOFT TISSUES, ADIPOSE TISSUE AND SUBCUTANEOUS TISSUE.  Site: KNEE   Live ultrasound was performed with of patient's  KNEE and PERMANENTLY documented. I personally performed the ultrasound and reviewed the findings. These show:    Shayy-articular evaluation:   An intact Quadriceps Tendon with the Quadriceps Muscle fibers showing normal striations Quadriceps Tendon demonstrating normal fibrillar pattern. . The Patellar Tendon demonstrates normal fibrillar pattern and is intact.   No significant soft tissue fluid collection/abscess appreciated.     Joint Evaluation:  The lateral joint line shows an intact LCL.   Medial joint line exam shows an intact MCL.   The patellar tendon was within normal limits.  Scant joint effusion noted.     The patient tolerated the procedure well.         The patient tolerated the procedure well.        L Inj/Asp: bilateral knee on 11/21/2024 1:57 PM  Details: ultrasound-guided superolateral approach  Medications (Right): 2.5 mg triamcinolone acetonide 40 mg/mL  Medications (Left): 2.5 mg triamcinolone acetonide 40 mg/mL  Procedure, treatment alternatives, risks and benefits explained, specific risks discussed. Immediately prior to procedure a time out was called to verify the correct patient, procedure, equipment, support staff and site/side marked  as required. Patient was prepped and draped in the usual sterile fashion.            Orders Placed This Encounter    Point of Care Ultrasound

## 2024-11-22 ENCOUNTER — APPOINTMENT (OUTPATIENT)
Dept: ORTHOPEDIC SURGERY | Facility: CLINIC | Age: 59
End: 2024-11-22
Payer: COMMERCIAL

## 2024-12-04 DIAGNOSIS — M10.9 ACUTE GOUT OF RIGHT KNEE, UNSPECIFIED CAUSE: ICD-10-CM

## 2024-12-05 RX ORDER — COLCHICINE 0.6 MG/1
TABLET ORAL
Qty: 30 TABLET | Refills: 1 | OUTPATIENT
Start: 2024-12-05

## 2024-12-27 ENCOUNTER — APPOINTMENT (OUTPATIENT)
Dept: PRIMARY CARE | Facility: CLINIC | Age: 59
End: 2024-12-27
Payer: COMMERCIAL

## 2025-01-05 ENCOUNTER — APPOINTMENT (OUTPATIENT)
Dept: RADIOLOGY | Facility: HOSPITAL | Age: 60
End: 2025-01-05
Payer: COMMERCIAL

## 2025-01-05 ENCOUNTER — HOSPITAL ENCOUNTER (EMERGENCY)
Facility: HOSPITAL | Age: 60
Discharge: HOME | End: 2025-01-05
Payer: COMMERCIAL

## 2025-01-05 VITALS
TEMPERATURE: 98.8 F | HEART RATE: 89 BPM | BODY MASS INDEX: 34.65 KG/M2 | WEIGHT: 270 LBS | DIASTOLIC BLOOD PRESSURE: 79 MMHG | SYSTOLIC BLOOD PRESSURE: 138 MMHG | RESPIRATION RATE: 16 BRPM | OXYGEN SATURATION: 97 % | HEIGHT: 74 IN

## 2025-01-05 DIAGNOSIS — S63.014A CLOSED DISLOCATION OF DISTAL RADIOULNAR JOINT OF RIGHT WRIST, INITIAL ENCOUNTER: Primary | ICD-10-CM
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
## 2025-01-05 PROCEDURE — 99284 EMERGENCY DEPT VISIT MOD MDM: CPT | Mod: 25

## 2025-01-05 PROCEDURE — 96374 THER/PROPH/DIAG INJ IV PUSH: CPT

## 2025-01-05 PROCEDURE — 2500000001 HC RX 250 WO HCPCS SELF ADMINISTERED DRUGS (ALT 637 FOR MEDICARE OP): Performed by: PHYSICIAN ASSISTANT

## 2025-01-05 PROCEDURE — 73130 X-RAY EXAM OF HAND: CPT | Mod: RIGHT SIDE | Performed by: STUDENT IN AN ORGANIZED HEALTH CARE EDUCATION/TRAINING PROGRAM

## 2025-01-05 PROCEDURE — 73110 X-RAY EXAM OF WRIST: CPT | Mod: RT

## 2025-01-05 PROCEDURE — 73130 X-RAY EXAM OF HAND: CPT | Mod: RT

## 2025-01-05 PROCEDURE — 2500000004 HC RX 250 GENERAL PHARMACY W/ HCPCS (ALT 636 FOR OP/ED): Performed by: PHYSICIAN ASSISTANT

## 2025-01-05 PROCEDURE — 73110 X-RAY EXAM OF WRIST: CPT | Mod: RIGHT SIDE | Performed by: STUDENT IN AN ORGANIZED HEALTH CARE EDUCATION/TRAINING PROGRAM

## 2025-01-05 PROCEDURE — 96372 THER/PROPH/DIAG INJ SC/IM: CPT | Performed by: PHYSICIAN ASSISTANT

## 2025-01-05 PROCEDURE — 96375 TX/PRO/DX INJ NEW DRUG ADDON: CPT

## 2025-01-05 RX ORDER — OXYCODONE AND ACETAMINOPHEN 5; 325 MG/1; MG/1
1 TABLET ORAL EVERY 6 HOURS PRN
Qty: 8 TABLET | Refills: 0 | Status: SHIPPED | OUTPATIENT
Start: 2025-01-05 | End: 2025-01-07

## 2025-01-05 RX ORDER — MORPHINE SULFATE 4 MG/ML
4 INJECTION, SOLUTION INTRAMUSCULAR; INTRAVENOUS ONCE
Status: COMPLETED | OUTPATIENT
Start: 2025-01-05 | End: 2025-01-05

## 2025-01-05 RX ORDER — ONDANSETRON HYDROCHLORIDE 2 MG/ML
4 INJECTION, SOLUTION INTRAVENOUS ONCE
Status: COMPLETED | OUTPATIENT
Start: 2025-01-05 | End: 2025-01-05

## 2025-01-05 RX ORDER — OXYCODONE AND ACETAMINOPHEN 5; 325 MG/1; MG/1
1 TABLET ORAL EVERY 6 HOURS PRN
Status: DISCONTINUED | OUTPATIENT
Start: 2025-01-05 | End: 2025-01-06 | Stop reason: HOSPADM

## 2025-01-05 RX ORDER — KETOROLAC TROMETHAMINE 30 MG/ML
30 INJECTION, SOLUTION INTRAMUSCULAR; INTRAVENOUS ONCE
Status: COMPLETED | OUTPATIENT
Start: 2025-01-05 | End: 2025-01-05

## 2025-01-05 RX ORDER — OXYCODONE AND ACETAMINOPHEN 5; 325 MG/1; MG/1
1 TABLET ORAL ONCE
Status: COMPLETED | OUTPATIENT
Start: 2025-01-05 | End: 2025-01-05

## 2025-01-05 RX ADMIN — MORPHINE SULFATE 4 MG: 4 INJECTION, SOLUTION INTRAMUSCULAR; INTRAVENOUS at 22:31

## 2025-01-05 RX ADMIN — OXYCODONE HYDROCHLORIDE AND ACETAMINOPHEN 1 TABLET: 5; 325 TABLET ORAL at 23:47

## 2025-01-05 RX ADMIN — KETOROLAC TROMETHAMINE 30 MG: 30 INJECTION, SOLUTION INTRAMUSCULAR at 20:32

## 2025-01-05 RX ADMIN — ONDANSETRON 4 MG: 2 INJECTION INTRAMUSCULAR; INTRAVENOUS at 22:31

## 2025-01-05 RX ADMIN — OXYCODONE HYDROCHLORIDE AND ACETAMINOPHEN 1 TABLET: 5; 325 TABLET ORAL at 20:32

## 2025-01-05 ASSESSMENT — PAIN DESCRIPTION - ORIENTATION
ORIENTATION: RIGHT
ORIENTATION: RIGHT

## 2025-01-05 ASSESSMENT — PAIN SCALES - GENERAL
PAINLEVEL_OUTOF10: 1
PAINLEVEL_OUTOF10: 8
PAINLEVEL_OUTOF10: 8

## 2025-01-05 ASSESSMENT — PAIN - FUNCTIONAL ASSESSMENT
PAIN_FUNCTIONAL_ASSESSMENT: 0-10
PAIN_FUNCTIONAL_ASSESSMENT: 0-10

## 2025-01-05 ASSESSMENT — ENCOUNTER SYMPTOMS
SORE THROAT: 0
ARTHRALGIAS: 0
DYSURIA: 0
ABDOMINAL PAIN: 0
HEMATURIA: 0
CHILLS: 0
SEIZURES: 0
PALPITATIONS: 0
FEVER: 0
SHORTNESS OF BREATH: 0
BACK PAIN: 0
EYE PAIN: 0
VOMITING: 0
COLOR CHANGE: 0
COUGH: 0

## 2025-01-05 ASSESSMENT — PAIN DESCRIPTION - LOCATION
LOCATION: WRIST
LOCATION: WRIST

## 2025-01-05 ASSESSMENT — COLUMBIA-SUICIDE SEVERITY RATING SCALE - C-SSRS
1. IN THE PAST MONTH, HAVE YOU WISHED YOU WERE DEAD OR WISHED YOU COULD GO TO SLEEP AND NOT WAKE UP?: NO
6. HAVE YOU EVER DONE ANYTHING, STARTED TO DO ANYTHING, OR PREPARED TO DO ANYTHING TO END YOUR LIFE?: NO
2. HAVE YOU ACTUALLY HAD ANY THOUGHTS OF KILLING YOURSELF?: NO

## 2025-01-06 NOTE — ED PROVIDER NOTES
Patient is a 59-year-old male who presents to the emergency room with a chief complaint of right wrist and hand pain.  He states that yesterday he fell while taking down [employer] lights landing on his right arm.  He denies hitting his head or loss of consciousness.  He denies any other injuries.  He reports that originally he was able to move his arm but then shortly thereafter developed swelling and worsening of right arm pain.  He has been taking over-the-counter medication with no relief.           Review of Systems   Constitutional:  Negative for chills and fever.   HENT:  Negative for ear pain and sore throat.    Eyes:  Negative for pain and visual disturbance.   Respiratory:  Negative for cough and shortness of breath.    Cardiovascular:  Negative for chest pain and palpitations.   Gastrointestinal:  Negative for abdominal pain and vomiting.   Genitourinary:  Negative for dysuria and hematuria.   Musculoskeletal:  Negative for arthralgias and back pain.   Skin:  Negative for color change and rash.   Neurological:  Negative for seizures and syncope.   All other systems reviewed and are negative.       Physical Exam  Vitals and nursing note reviewed.   Constitutional:       General: He is not in acute distress.     Appearance: He is well-developed. He is not ill-appearing.   HENT:      Head: Normocephalic and atraumatic.      Mouth/Throat:      Pharynx: No oropharyngeal exudate or posterior oropharyngeal erythema.   Eyes:      Extraocular Movements: Extraocular movements intact.      Conjunctiva/sclera: Conjunctivae normal.   Cardiovascular:      Rate and Rhythm: Normal rate and regular rhythm.      Heart sounds: No murmur heard.  Pulmonary:      Effort: Pulmonary effort is normal. No respiratory distress.      Breath sounds: Normal breath sounds.   Abdominal:      Palpations: Abdomen is soft.   Musculoskeletal:         General: No swelling.      Right wrist: Swelling, tenderness, bony tenderness and snuff box  tenderness present. No deformity, effusion, lacerations or crepitus. Decreased range of motion. Normal pulse.      Right hand: Swelling, tenderness and bony tenderness present. No deformity or lacerations. Decreased range of motion. Decreased strength. Normal sensation. There is no disruption of two-point discrimination. Normal capillary refill. Normal pulse.      Cervical back: Normal range of motion and neck supple. No rigidity.   Skin:     General: Skin is warm and dry.      Capillary Refill: Capillary refill takes less than 2 seconds.   Neurological:      General: No focal deficit present.      Mental Status: He is alert and oriented to person, place, and time.   Psychiatric:         Mood and Affect: Mood normal.          Labs Reviewed - No data to display     XR hand right 3+ views   Final Result   1. Suspect dorsal dislocation of the distal radioulnar joint.        2. No acute fracture or malalignment of the right hand.        3. Severe 1st carpometacarpal joint osteoarthrosis.        MACRO:   None.        Signed by: Moises Villareal 1/5/2025 9:29 PM   Dictation workstation:   [id number]      XR wrist right 3+ views   Final Result   1. Suspect dorsal dislocation of the distal radioulnar joint.        2. No acute fracture or malalignment of the right hand.        3. Severe 1st carpometacarpal joint osteoarthrosis.        MACRO:   None.        Signed by: Moises Villareal 1/5/2025 9:29 PM   Dictation workstation:   [id number]      XR wrist right 3+ views    (Results Pending)        Orthopaedic Injury Treatment - Upper Extremity    Performed by: Ricardo Hernandez DO  Authorized by: Ivana Arauz PA-C    Consent:     Consent obtained:  Verbal    Consent given by:  Patient    Risks, benefits, and alternatives were discussed: yes      Alternatives discussed:  No treatment  Universal protocol:     Patient identity confirmed:  Verbally with patient  Location:     Location:  Wrist    Wrist location:  R wrist     Wrist dislocation type: distal radioulnar    Pre-procedure details:     Pre-procedure imaging:  X-ray    Imaging findings: dislocation present      Distal perfusion: normal    Sedation:     Sedation type:  Anxiolysis  Procedure details:     Manipulation performed: yes      Wrist reduction method:  Direct traction and traction and counter traction    Immobilization:  Splint    Splint type: supination.       Medical Decision Making  Patient is a 59-year-old male who presents to the emergency room with right wrist pain after a fall that occurred yesterday.  Patient's x-ray shows a dorsal dislocation of the distal radial ulnar joint with no acute fracture.  Discussed x-ray with Ortho on-call, Dr. Quispe.  He states to attempt reduction and place in splint and follow-up with Ortho hand.  Reduction performed by Dr. Ricardo Hernandez, with myself assisting. Patient placed in splint per recommendation of ortho. X-ray interpretation pending. Patient care transitioned to Dr. Hernandez @ 0972    Amount and/or Complexity of Data Reviewed  Radiology: ordered. Decision-making details documented in ED Course.         Diagnoses as of 01/05/25 2327   Closed dislocation of distal radioulnar joint of right wrist, initial encounter   Fall, initial encounter                    Ivana Arauz PA-C  01/05/25 2321

## 2025-01-07 NOTE — ED NOTES
"Pt called in after being in ED on Sunday. Pt sates that he was referred to Dr. Pina after ED visit for consultation for hand injury.  was unable to advise pt as it was \"out of his wheelhouse\" per pt and referred to hand specialist. Pt unable to see specialist until Thursday and he was questioning if he could remove dressing and re-dress at home. This RN consulted with Dr. Henderson and pt was advised to leave the dressing as is and he can add an ACE wrap overtop if the splint is becoming loose. Pt in agreement and will follow up with hand specialist on Thursday.      Shanon Velez RN  01/07/25 3834    "

## 2025-01-08 ENCOUNTER — APPOINTMENT (OUTPATIENT)
Dept: ORTHOPEDIC SURGERY | Facility: CLINIC | Age: 60
End: 2025-01-08
Payer: COMMERCIAL

## 2025-02-14 ENCOUNTER — OFFICE (OUTPATIENT)
Dept: URBAN - METROPOLITAN AREA PATHOLOGY 2 | Facility: PATHOLOGY | Age: 60
End: 2025-02-14
Payer: COMMERCIAL

## 2025-02-14 ENCOUNTER — AMBULATORY SURGICAL CENTER (OUTPATIENT)
Dept: URBAN - METROPOLITAN AREA SURGERY 12 | Facility: SURGERY | Age: 60
End: 2025-02-14

## 2025-02-14 ENCOUNTER — AMBULATORY SURGICAL CENTER (OUTPATIENT)
Dept: URBAN - METROPOLITAN AREA SURGERY 12 | Facility: SURGERY | Age: 60
End: 2025-02-14
Payer: COMMERCIAL

## 2025-02-14 VITALS
HEART RATE: 73 BPM | RESPIRATION RATE: 2 BRPM | DIASTOLIC BLOOD PRESSURE: 95 MMHG | DIASTOLIC BLOOD PRESSURE: 81 MMHG | RESPIRATION RATE: 18 BRPM | HEART RATE: 78 BPM | HEART RATE: 77 BPM | SYSTOLIC BLOOD PRESSURE: 103 MMHG | HEART RATE: 69 BPM | SYSTOLIC BLOOD PRESSURE: 118 MMHG | SYSTOLIC BLOOD PRESSURE: 114 MMHG | DIASTOLIC BLOOD PRESSURE: 70 MMHG | RESPIRATION RATE: 6 BRPM | DIASTOLIC BLOOD PRESSURE: 95 MMHG | DIASTOLIC BLOOD PRESSURE: 78 MMHG | DIASTOLIC BLOOD PRESSURE: 67 MMHG | HEART RATE: 77 BPM | RESPIRATION RATE: 11 BRPM | HEART RATE: 64 BPM | SYSTOLIC BLOOD PRESSURE: 102 MMHG | DIASTOLIC BLOOD PRESSURE: 81 MMHG | RESPIRATION RATE: 2 BRPM | HEART RATE: 74 BPM | OXYGEN SATURATION: 97 % | HEART RATE: 76 BPM | DIASTOLIC BLOOD PRESSURE: 64 MMHG | SYSTOLIC BLOOD PRESSURE: 121 MMHG | RESPIRATION RATE: 11 BRPM | HEART RATE: 74 BPM | DIASTOLIC BLOOD PRESSURE: 74 MMHG | DIASTOLIC BLOOD PRESSURE: 70 MMHG | DIASTOLIC BLOOD PRESSURE: 78 MMHG | HEART RATE: 79 BPM | RESPIRATION RATE: 20 BRPM | DIASTOLIC BLOOD PRESSURE: 74 MMHG | RESPIRATION RATE: 15 BRPM | RESPIRATION RATE: 18 BRPM | SYSTOLIC BLOOD PRESSURE: 114 MMHG | DIASTOLIC BLOOD PRESSURE: 75 MMHG | HEART RATE: 64 BPM | DIASTOLIC BLOOD PRESSURE: 67 MMHG | RESPIRATION RATE: 17 BRPM | SYSTOLIC BLOOD PRESSURE: 118 MMHG | HEIGHT: 74 IN | RESPIRATION RATE: 15 BRPM | DIASTOLIC BLOOD PRESSURE: 64 MMHG | SYSTOLIC BLOOD PRESSURE: 125 MMHG | SYSTOLIC BLOOD PRESSURE: 111 MMHG | DIASTOLIC BLOOD PRESSURE: 67 MMHG | SYSTOLIC BLOOD PRESSURE: 125 MMHG | HEART RATE: 80 BPM | RESPIRATION RATE: 20 BRPM | OXYGEN SATURATION: 98 % | SYSTOLIC BLOOD PRESSURE: 119 MMHG | WEIGHT: 270 LBS | DIASTOLIC BLOOD PRESSURE: 95 MMHG | SYSTOLIC BLOOD PRESSURE: 119 MMHG | RESPIRATION RATE: 6 BRPM | RESPIRATION RATE: 15 BRPM | HEART RATE: 65 BPM | SYSTOLIC BLOOD PRESSURE: 109 MMHG | SYSTOLIC BLOOD PRESSURE: 121 MMHG | SYSTOLIC BLOOD PRESSURE: 102 MMHG | HEART RATE: 65 BPM | DIASTOLIC BLOOD PRESSURE: 66 MMHG | RESPIRATION RATE: 6 BRPM | OXYGEN SATURATION: 99 % | DIASTOLIC BLOOD PRESSURE: 72 MMHG | OXYGEN SATURATION: 97 % | HEART RATE: 74 BPM | RESPIRATION RATE: 9 BRPM | SYSTOLIC BLOOD PRESSURE: 109 MMHG | SYSTOLIC BLOOD PRESSURE: 102 MMHG | SYSTOLIC BLOOD PRESSURE: 111 MMHG | RESPIRATION RATE: 9 BRPM | OXYGEN SATURATION: 99 % | DIASTOLIC BLOOD PRESSURE: 75 MMHG | HEART RATE: 73 BPM | RESPIRATION RATE: 9 BRPM | HEART RATE: 79 BPM | SYSTOLIC BLOOD PRESSURE: 121 MMHG | HEART RATE: 78 BPM | HEART RATE: 76 BPM | TEMPERATURE: 98.1 F | OXYGEN SATURATION: 99 % | OXYGEN SATURATION: 98 % | HEIGHT: 74 IN | HEART RATE: 65 BPM | HEART RATE: 76 BPM | WEIGHT: 270 LBS | DIASTOLIC BLOOD PRESSURE: 74 MMHG | SYSTOLIC BLOOD PRESSURE: 125 MMHG | OXYGEN SATURATION: 98 % | SYSTOLIC BLOOD PRESSURE: 103 MMHG | OXYGEN SATURATION: 95 % | SYSTOLIC BLOOD PRESSURE: 119 MMHG | HEART RATE: 79 BPM | SYSTOLIC BLOOD PRESSURE: 103 MMHG | RESPIRATION RATE: 18 BRPM | DIASTOLIC BLOOD PRESSURE: 75 MMHG | RESPIRATION RATE: 20 BRPM | DIASTOLIC BLOOD PRESSURE: 72 MMHG | OXYGEN SATURATION: 95 % | HEART RATE: 69 BPM | HEART RATE: 80 BPM | SYSTOLIC BLOOD PRESSURE: 118 MMHG | TEMPERATURE: 98.1 F | DIASTOLIC BLOOD PRESSURE: 70 MMHG | SYSTOLIC BLOOD PRESSURE: 135 MMHG | DIASTOLIC BLOOD PRESSURE: 81 MMHG | DIASTOLIC BLOOD PRESSURE: 64 MMHG | HEART RATE: 73 BPM | HEART RATE: 78 BPM | SYSTOLIC BLOOD PRESSURE: 109 MMHG | DIASTOLIC BLOOD PRESSURE: 72 MMHG | HEART RATE: 77 BPM | DIASTOLIC BLOOD PRESSURE: 66 MMHG | OXYGEN SATURATION: 97 % | SYSTOLIC BLOOD PRESSURE: 135 MMHG | RESPIRATION RATE: 2 BRPM | SYSTOLIC BLOOD PRESSURE: 111 MMHG | HEIGHT: 74 IN | OXYGEN SATURATION: 95 % | RESPIRATION RATE: 11 BRPM | SYSTOLIC BLOOD PRESSURE: 135 MMHG | HEART RATE: 64 BPM | DIASTOLIC BLOOD PRESSURE: 78 MMHG | HEART RATE: 80 BPM | RESPIRATION RATE: 17 BRPM | SYSTOLIC BLOOD PRESSURE: 114 MMHG | DIASTOLIC BLOOD PRESSURE: 66 MMHG | HEART RATE: 69 BPM | RESPIRATION RATE: 17 BRPM | TEMPERATURE: 98.1 F | WEIGHT: 270 LBS

## 2025-02-14 DIAGNOSIS — Z86.0101 PERSONAL HISTORY OF ADENOMATOUS AND SERRATED COLON POLYPS: ICD-10-CM

## 2025-02-14 DIAGNOSIS — K57.30 DIVERTICULOSIS OF LARGE INTESTINE WITHOUT PERFORATION OR ABS: ICD-10-CM

## 2025-02-14 DIAGNOSIS — K64.8 OTHER HEMORRHOIDS: ICD-10-CM

## 2025-02-14 DIAGNOSIS — Z09 ENCOUNTER FOR FOLLOW-UP EXAMINATION AFTER COMPLETED TREATMEN: ICD-10-CM

## 2025-02-14 DIAGNOSIS — K63.5 POLYP OF COLON: ICD-10-CM

## 2025-02-14 PROCEDURE — 45384 COLONOSCOPY W/LESION REMOVAL: CPT | Performed by: INTERNAL MEDICINE

## 2025-02-14 PROCEDURE — 88305 TISSUE EXAM BY PATHOLOGIST: CPT | Performed by: PATHOLOGY

## 2025-02-28 ENCOUNTER — APPOINTMENT (OUTPATIENT)
Dept: ORTHOPEDIC SURGERY | Facility: CLINIC | Age: 60
End: 2025-02-28
Payer: COMMERCIAL

## 2025-04-09 ENCOUNTER — APPOINTMENT (OUTPATIENT)
Dept: ORTHOPEDIC SURGERY | Facility: CLINIC | Age: 60
End: 2025-04-09
Payer: COMMERCIAL

## 2025-04-09 ENCOUNTER — HOSPITAL ENCOUNTER (OUTPATIENT)
Dept: RADIOLOGY | Facility: EXTERNAL LOCATION | Age: 60
Discharge: HOME | End: 2025-04-09

## 2025-04-09 ENCOUNTER — HOSPITAL ENCOUNTER (OUTPATIENT)
Dept: RADIOLOGY | Facility: CLINIC | Age: 60
Discharge: HOME | End: 2025-04-09
Payer: COMMERCIAL

## 2025-04-09 DIAGNOSIS — M25.511 CHRONIC RIGHT SHOULDER PAIN: ICD-10-CM

## 2025-04-09 DIAGNOSIS — M25.511 ACUTE PAIN OF RIGHT SHOULDER: ICD-10-CM

## 2025-04-09 DIAGNOSIS — G89.29 CHRONIC RIGHT SHOULDER PAIN: ICD-10-CM

## 2025-04-09 PROCEDURE — 1036F TOBACCO NON-USER: CPT | Performed by: SPECIALIST

## 2025-04-09 PROCEDURE — 99214 OFFICE O/P EST MOD 30 MIN: CPT | Performed by: SPECIALIST

## 2025-04-09 PROCEDURE — 73030 X-RAY EXAM OF SHOULDER: CPT | Mod: RT

## 2025-04-09 PROCEDURE — 73030 X-RAY EXAM OF SHOULDER: CPT | Mod: RIGHT SIDE | Performed by: RADIOLOGY

## 2025-04-09 RX ORDER — IBUPROFEN 600 MG/1
600 TABLET ORAL EVERY 6 HOURS PRN
Qty: 28 TABLET | Refills: 1 | Status: SHIPPED | OUTPATIENT
Start: 2025-04-09

## 2025-04-09 RX ORDER — DICLOFENAC SODIUM 10 MG/G
4 GEL TOPICAL 4 TIMES DAILY PRN
Qty: 100 G | Refills: 1 | Status: SHIPPED | OUTPATIENT
Start: 2025-04-09

## 2025-04-09 ASSESSMENT — PAIN SCALES - GENERAL: PAINLEVEL_OUTOF10: 7

## 2025-04-09 ASSESSMENT — PAIN - FUNCTIONAL ASSESSMENT: PAIN_FUNCTIONAL_ASSESSMENT: 0-10

## 2025-04-09 ASSESSMENT — PAIN DESCRIPTION - DESCRIPTORS: DESCRIPTORS: STABBING;SHARP

## 2025-04-09 NOTE — PROGRESS NOTES
Assessment/Plan   Encounter Diagnoses:  Acute pain of right shoulder    Chronic right shoulder pain  Acute pain of right shoulder  Assessment: Status post 1/4/2025 injury and probable acute right rotator cuff tears supraspinatus and infraspinatus.    Plan:  An MRI of the right shoulder should be obtained to evaluate the muscles and other soft tissues about the shoulder and define the retraction of the rotator cuff for surgical planning.  Voltaren gel use as directed.  Motrin 600 mg p.o. twice daily or 3 times daily as needed pain.  Physical therapy for gentle range of motion and strengthening understanding he has a rotator cuff tear.  Follow-up after the MRI is completed.         Subjective    Patient ID: Ricardo Newman is a 59 y.o. male.    Chief Complaint: Pain of the Right Shoulder (FALL 1-4-25/)     Last Surgery: No surgery found  Last Surgery Date: No surgery found    HPI  59-year-old who is a  for the [employer].  He states that in January he fell and sustained a distal radial ulnar joint dislocation.  This was treated elsewhere.  He still in a cock up splint for this injury.  He is presently under treatment at the [facility].  He comes in today complaining of right shoulder weakness and pain.  He states that he had bruising in the shoulder area at the time of the injury.  He has had pain in the shoulder ever since the fall.    OBJECTIVE: ORTHO EXAM  Right shoulder:  Inspection:  Skin healthy to gross inspection  No ecchymosis, no edema, no gross atrophy    Palpation:  Acromioclavicular joint minimal tenderness   Biceps tendon/ groove mild tenderness  Anterior Acromial Bursal Area moderate tenderness  Cervical spine minimal tenderness     ROM:  Forward Flexion 45 degrees active passively he gets to about 130 and then has guarding and pain.  External Rotation 45 degrees at 90 degrees abduction  Internal Rotation 30 degrees at 90 degrees abduction    Strength:  4/5 Supraspinatus  isolation- resisted elevation  4/5 Infraspinatus isolation- ER  5 -/5 Subscapularis- IR     Negative lift off test   Negative Spurling´s test  Positive Neer and Hawking´s test  Negative Speed's test  Negative Inferior Sulcus  Negative Anterior Apprehension    Full unrestricted motion at Elbow/Wrist/Hand  Neurovascular exam normal distally        IMAGE RESULTS:  Point of Care Ultrasound  These images are not reportable by radiology and will not be interpreted   by  Radiologists.      ULTRASOUND  DIAGNOSTIC ULTRASOUND FINAL REPORT: Right SHOULDER  Provider: Blaise Pina MD  Date of Exam: Today  Procedure: Ultrasound, extremity, nonvascular, real-time, COMPLETE, anatomic specific.  Site:   SHOULDER  Indication:  SHOULDER PAIN  Technique: B-Mode Ultrasound Examination performed using 8-13 MHz linear transducer with [employer]  STUDY TYPE:   1. ULTRASOUND EXTREMITY INCLUDING BUT NOT LIMITED TO SHOULDER MUSCLE, TENDONS, LIGAMENTS, FATTY TISSUES, SUBCUTANEOUS TISSUES AND OTHER SOFT TISSUE STRUCTURES SUCH AS ABSCESSES OR FREE FLUID ACCUMULATION WITHIN THE PRIMARY JOINT AS WELL AS ADJACENT JOINTS.  2. REAL TIME WITH IMAGE DOCUMENTATION  3. NON-VASCULAR  4. COMPLETE STUDY WHICH INCLUDES A THOROUGH EVALUATION OF THE SHOULDER MUSCLE, TENDONS, LIGAMENTS, FATTY TISSUES, SUBCUTANEOUS TISSUES AND OTHER SOFT TISSUE STRUCTURES SUCH AS ABSCESSES OR FREE FLUID ACCUMULATION WITHIN THE PRIMARY JOINT AS WELL AS ADJACENT JOINTS.  Live ultrasound was performed of the patient´s  SHOULDER and PERMANENTLY documented.  This is a thorough and complete evaluation of a specific anatomic region specifically the  SHOULDER.  PERMANENT Image documentation was performed.  This is the complete and final ultrasound report of the patient's  SHOULDER.  The patient was positioned in order to optimize the ultrasound evaluation of the  SHOULDER.  Ultrasound gel was used as a conductive medium in order to both transmit and receive ultrasonic signals that  characterize the soft tissues. . I personally performed the ultrasound and reviewed the findings. These show:  Findings:  SHOULDER  Shayy-articular evaluation: Live ultrasound was performed of the patient's  SHOULDER that shows complete tears with retraction of the supraspinatus and infraspinatus tendons with the deltoid muscle fibers showing normal striations.  There was mild sub acromial effusion.  Evaluation of the subscapularis with the arm in external rotation showed an intact and normal appearing subscapularis tendon.    Joint Evaluation: The biceps tendon was visualized within the bicipital groove.  Thumb atrophy of the supraspinatus and infraspinatus muscle bellies was noted.        Procedures     Orders Placed This Encounter    XR shoulder right 2+ views    Point of Care Ultrasound    MR shoulder right wo IV contrast    Referral to Physical Therapy

## 2025-04-09 NOTE — ASSESSMENT & PLAN NOTE
Assessment: Status post 1/4/2025 injury and probable acute right rotator cuff tears supraspinatus and infraspinatus.    Plan:  An MRI of the right shoulder should be obtained to evaluate the muscles and other soft tissues about the shoulder and define the retraction of the rotator cuff for surgical planning.  Voltaren gel use as directed.  Motrin 600 mg p.o. twice daily or 3 times daily as needed pain.  Physical therapy for gentle range of motion and strengthening understanding he has a rotator cuff tear.  Follow-up after the MRI is completed.

## 2025-04-23 ENCOUNTER — HOSPITAL ENCOUNTER (OUTPATIENT)
Dept: RADIOLOGY | Facility: HOSPITAL | Age: 60
Discharge: HOME | End: 2025-04-23
Payer: COMMERCIAL

## 2025-04-23 DIAGNOSIS — G89.29 CHRONIC RIGHT SHOULDER PAIN: ICD-10-CM

## 2025-04-23 DIAGNOSIS — M25.511 CHRONIC RIGHT SHOULDER PAIN: ICD-10-CM

## 2025-04-23 PROCEDURE — 73221 MRI JOINT UPR EXTREM W/O DYE: CPT | Mod: RIGHT SIDE | Performed by: STUDENT IN AN ORGANIZED HEALTH CARE EDUCATION/TRAINING PROGRAM

## 2025-04-23 PROCEDURE — 73221 MRI JOINT UPR EXTREM W/O DYE: CPT | Mod: RT

## 2025-04-24 ENCOUNTER — TELEPHONE (OUTPATIENT)
Dept: ORTHOPEDIC SURGERY | Facility: CLINIC | Age: 60
End: 2025-04-24
Payer: COMMERCIAL

## 2025-04-24 DIAGNOSIS — M25.561 BILATERAL CHRONIC KNEE PAIN: ICD-10-CM

## 2025-04-24 DIAGNOSIS — M25.562 BILATERAL CHRONIC KNEE PAIN: ICD-10-CM

## 2025-04-24 DIAGNOSIS — G89.29 BILATERAL CHRONIC KNEE PAIN: ICD-10-CM

## 2025-04-24 NOTE — TELEPHONE ENCOUNTER
Return call to Ricardo to inform him that I spoke to Dr. Pina and he stated that we could add in the bilateral knee injections to his visit on Wednesday April 30th @ 4:00

## 2025-04-30 ENCOUNTER — HOSPITAL ENCOUNTER (OUTPATIENT)
Dept: RADIOLOGY | Facility: EXTERNAL LOCATION | Age: 60
Discharge: HOME | End: 2025-04-30

## 2025-04-30 ENCOUNTER — APPOINTMENT (OUTPATIENT)
Dept: ORTHOPEDIC SURGERY | Facility: CLINIC | Age: 60
End: 2025-04-30
Payer: COMMERCIAL

## 2025-05-02 ENCOUNTER — HOSPITAL ENCOUNTER (OUTPATIENT)
Dept: RADIOLOGY | Facility: EXTERNAL LOCATION | Age: 60
Discharge: HOME | End: 2025-05-02

## 2025-05-02 ENCOUNTER — HOSPITAL ENCOUNTER (OUTPATIENT)
Dept: RADIOLOGY | Facility: CLINIC | Age: 60
Discharge: HOME | End: 2025-05-02
Payer: COMMERCIAL

## 2025-05-02 ENCOUNTER — OFFICE VISIT (OUTPATIENT)
Dept: ORTHOPEDIC SURGERY | Facility: CLINIC | Age: 60
End: 2025-05-02
Payer: COMMERCIAL

## 2025-05-02 DIAGNOSIS — M25.561 BILATERAL CHRONIC KNEE PAIN: ICD-10-CM

## 2025-05-02 DIAGNOSIS — M25.562 BILATERAL CHRONIC KNEE PAIN: ICD-10-CM

## 2025-05-02 DIAGNOSIS — G89.29 BILATERAL CHRONIC KNEE PAIN: ICD-10-CM

## 2025-05-02 DIAGNOSIS — M17.0 PRIMARY OSTEOARTHRITIS OF BOTH KNEES: ICD-10-CM

## 2025-05-02 DIAGNOSIS — M25.511 CHRONIC RIGHT SHOULDER PAIN: ICD-10-CM

## 2025-05-02 DIAGNOSIS — G89.29 CHRONIC RIGHT SHOULDER PAIN: ICD-10-CM

## 2025-05-02 PROCEDURE — 73564 X-RAY EXAM KNEE 4 OR MORE: CPT | Mod: 50

## 2025-05-02 PROCEDURE — 99214 OFFICE O/P EST MOD 30 MIN: CPT | Performed by: SPECIALIST

## 2025-05-02 PROCEDURE — 76882 US LMTD JT/FCL EVL NVASC XTR: CPT | Performed by: SPECIALIST

## 2025-05-02 ASSESSMENT — PAIN DESCRIPTION - DESCRIPTORS: DESCRIPTORS: SHARP

## 2025-05-02 ASSESSMENT — PAIN SCALES - GENERAL: PAINLEVEL_OUTOF10: 7

## 2025-05-02 ASSESSMENT — PAIN - FUNCTIONAL ASSESSMENT: PAIN_FUNCTIONAL_ASSESSMENT: 0-10

## 2025-05-02 NOTE — ASSESSMENT & PLAN NOTE
Bilateral osteoarthritis of the knees.  Right shoulder full-thickness rotator cuff tear with retraction of the supraspinatus and the anterior half of the infraspinatus with partial tearing of the posterior half.  Atrophy of the teres minor    Plan:  Bilateral 40 mg of Kenalog were injected into both knees.  He tolerated this well with the ultrasound control and had a good lidocaine suppression test.    Right shoulder full-thickness rotator cuff tear discussed the risks and benefits of various treatment options.

## 2025-05-02 NOTE — PROGRESS NOTES
Assessment/Plan   Encounter Diagnoses:  Chronic right shoulder pain    Primary osteoarthritis of both knees  No problem-specific Assessment & Plan notes found for this encounter.         Subjective    Patient ID: Ricardo Newman is a 59 y.o. male.    Chief Complaint: No chief complaint on file.     Last Surgery: No surgery found  Last Surgery Date: No surgery found    HPI  ***    OBJECTIVE: ORTHO EXAM  ***    IMAGE RESULTS:  Point of Care Ultrasound  These images are not reportable by radiology and will not be interpreted   by  Radiologists.      ULTRASOUND  ***    Procedures     Orders Placed This Encounter   • Point of Care Ultrasound      degrees Flexion  Minimal crepitance with range of motion  No pain with internal rotation of the hip  Tenderness to palpation: Medial joint line     15 degrees laxity to valgus stress  Neutral laxity to varus stress  Negative Lachman´s test  Negative posterior drawer test  Minimal pain with Soren´s test     Neurovascular exam normal distally  2+ DP pulse and good cap refill      Right shoulder:  Inspection:  Skin healthy to gross inspection  No ecchymosis, no edema, no gross atrophy    Palpation:  Acromioclavicular joint no tenderness   Biceps tendon/ groove minimal tenderness  Anterior Acromial Bursal Area moderate tenderness  Cervical spine no tenderness     ROM:  Forward Flexion 120 then pain 260 degrees active  External Rotation 50 degrees at 90 degrees abduction  Internal Rotation 40 degrees at 90 degrees abduction    Strength:  4/5 Supraspinatus isolation- resisted elevation  4/5 Infraspinatus isolation- ER  5/5 Subscapularis- IR     Negative lift off test   Negative Spurling´s test  Positive Neer and Hawking´s test  Negative Speed's test  Negative Inferior Sulcus  Negative Anterior Apprehension    Full unrestricted motion at Elbow/Wrist/Hand  Neurovascular exam normal distally    The MRI of the right shoulder shows full-thickness tear of the supraspinatus and the anterior half of the infraspinatus tendon.  Also noted incidentally was some atrophy of the teres minor.    IMAGE RESULTS:  Point of Care Ultrasound  These images are not reportable by radiology and will not be interpreted   by  Radiologists.      ULTRASOUND      L Inj/Asp: bilateral knee on 5/5/2025 1:47 PM  Indications: pain  Details: 18 G needle, ultrasound-guided superolateral approach  Medications (Right): 2.5 mg triamcinolone acetonide 40 mg/mL  Medications (Left): 2.5 mg triamcinolone acetonide 40 mg/mL  Procedure, treatment alternatives, risks and benefits explained, specific risks discussed. Consent was given by the patient. Immediately  prior to procedure a time out was called to verify the correct patient, procedure, equipment, support staff and site/side marked as required. Patient was prepped and draped in the usual sterile fashion.            Orders Placed This Encounter    Point of Care Ultrasound

## 2025-05-05 ENCOUNTER — PREP FOR PROCEDURE (OUTPATIENT)
Dept: ORTHOPEDIC SURGERY | Facility: CLINIC | Age: 60
End: 2025-05-05
Payer: COMMERCIAL

## 2025-05-05 DIAGNOSIS — M25.511 ACUTE PAIN OF RIGHT SHOULDER: ICD-10-CM

## 2025-05-05 PROCEDURE — 20611 DRAIN/INJ JOINT/BURSA W/US: CPT | Performed by: SPECIALIST

## 2025-05-05 RX ORDER — TRIAMCINOLONE ACETONIDE 40 MG/ML
2.5 INJECTION, SUSPENSION INTRA-ARTICULAR; INTRAMUSCULAR
Status: COMPLETED | OUTPATIENT
Start: 2025-05-05 | End: 2025-05-05

## 2025-05-05 RX ADMIN — TRIAMCINOLONE ACETONIDE 2.5 MG: 40 INJECTION, SUSPENSION INTRA-ARTICULAR; INTRAMUSCULAR at 13:47

## 2025-05-12 ENCOUNTER — TELEPHONE (OUTPATIENT)
Dept: ORTHOPEDIC SURGERY | Facility: CLINIC | Age: 60
End: 2025-05-12
Payer: COMMERCIAL

## 2025-05-12 NOTE — TELEPHONE ENCOUNTER
ATTEMPTED TO MAKE CONTACT WITH PATIENT TO LET HIM KNOW THAT HIS SURGERY HAS BEEN DENIED ON HIS RIGHT SHOULDER. COULD NOT LEAVE A MESSAGE FOR PATIENT DUE TO VM BOX BEING FULL. WILL ATTEMPT TO MAKE CONTACT WITH PATIENT LATER THIS DATE

## 2025-05-15 ENCOUNTER — TELEPHONE (OUTPATIENT)
Dept: ORTHOPEDIC SURGERY | Facility: CLINIC | Age: 60
End: 2025-05-15
Payer: COMMERCIAL

## 2025-05-15 NOTE — TELEPHONE ENCOUNTER
PATIENT RETURNED PHONE CALL TO OFFICE AND STATED THAT HE WAS GOING TO POSTPONE SURGERY UNTIL AROUND SHARONDA TIME BUT IS GOING TO ATTEMPT TO COMPLETE PT THAT WAS ORDERED AND WOULD CALL THE OFFICE BACK FOR POSSIBLE CORTISONE INJECTION IN SHOULDER IF NEEDED

## 2025-05-15 NOTE — TELEPHONE ENCOUNTER
SPOKE WITH PATIENT'S WIFE AND LET HER KNOW THAT PATIENT'S SURGERY WAS DENIED DUE TO NO PT COMPLETED. LET HER KNOW THAT IF THERAPY IS NOT POSSIBLE THAT THE PT CAN PUT IN THEIR NOTE THAT IT CANNOT BE DONE AND CAN BRING HIM BACK IN TO SEE DR CHÁVEZ FOR UPDATED CLINICALS TO RESUBMIT TO INSURANCE. PATIENT'S WIFE WAS AGREEABLE

## 2025-06-02 ENCOUNTER — TELEPHONE (OUTPATIENT)
Dept: ORTHOPEDIC SURGERY | Facility: CLINIC | Age: 60
End: 2025-06-02
Payer: COMMERCIAL

## 2025-06-02 DIAGNOSIS — G89.29 CHRONIC RIGHT SHOULDER PAIN: ICD-10-CM

## 2025-06-02 DIAGNOSIS — M25.511 CHRONIC RIGHT SHOULDER PAIN: ICD-10-CM

## 2025-06-02 NOTE — TELEPHONE ENCOUNTER
Phone call from Ricardo wanting to know if we could change his physical therapy order to Tamra freeman offsite of [facility]. Informed patient that I faxed the order over to [phone] that the patient provided.

## 2025-06-09 ENCOUNTER — TELEPHONE (OUTPATIENT)
Dept: PRIMARY CARE | Facility: CLINIC | Age: 60
End: 2025-06-09
Payer: COMMERCIAL

## 2025-06-09 DIAGNOSIS — M10.9 ACUTE GOUT OF RIGHT KNEE, UNSPECIFIED CAUSE: ICD-10-CM

## 2025-06-09 RX ORDER — COLCHICINE 0.6 MG/1
TABLET ORAL
Qty: 30 TABLET | Refills: 1 | Status: SHIPPED | OUTPATIENT
Start: 2025-06-09

## 2025-06-10 ENCOUNTER — APPOINTMENT (OUTPATIENT)
Dept: ORTHOPEDIC SURGERY | Facility: CLINIC | Age: 60
End: 2025-06-10
Payer: COMMERCIAL

## 2025-06-25 DIAGNOSIS — E03.9 ACQUIRED HYPOTHYROIDISM: ICD-10-CM

## 2025-06-25 DIAGNOSIS — F41.1 GAD (GENERALIZED ANXIETY DISORDER): ICD-10-CM

## 2025-06-25 RX ORDER — LEVOTHYROXINE SODIUM 137 UG/1
137 TABLET ORAL DAILY
Qty: 90 TABLET | Refills: 3 | OUTPATIENT
Start: 2025-06-25

## 2025-07-09 RX ORDER — VENLAFAXINE HYDROCHLORIDE 75 MG/1
75 CAPSULE, EXTENDED RELEASE ORAL
Qty: 90 CAPSULE | Refills: 3 | Status: SHIPPED | OUTPATIENT
Start: 2025-07-09 | End: 2026-07-09

## 2025-07-09 RX ORDER — VENLAFAXINE HYDROCHLORIDE 150 MG/1
150 CAPSULE, EXTENDED RELEASE ORAL DAILY
Qty: 90 CAPSULE | Refills: 3 | Status: SHIPPED | OUTPATIENT
Start: 2025-07-09 | End: 2026-07-09

## 2025-07-11 ENCOUNTER — OFFICE VISIT (OUTPATIENT)
Dept: ORTHOPEDIC SURGERY | Facility: CLINIC | Age: 60
End: 2025-07-11
Payer: COMMERCIAL

## 2025-07-11 ENCOUNTER — HOSPITAL ENCOUNTER (OUTPATIENT)
Dept: RADIOLOGY | Facility: EXTERNAL LOCATION | Age: 60
Discharge: HOME | End: 2025-07-11

## 2025-07-11 DIAGNOSIS — G89.29 CHRONIC RIGHT SHOULDER PAIN: ICD-10-CM

## 2025-07-11 DIAGNOSIS — M25.511 CHRONIC RIGHT SHOULDER PAIN: ICD-10-CM

## 2025-07-11 RX ORDER — TRIAMCINOLONE ACETONIDE 40 MG/ML
2.5 INJECTION, SUSPENSION INTRA-ARTICULAR; INTRAMUSCULAR
Status: COMPLETED | OUTPATIENT
Start: 2025-07-11 | End: 2025-07-11

## 2025-07-11 RX ADMIN — TRIAMCINOLONE ACETONIDE 2.5 MG: 40 INJECTION, SUSPENSION INTRA-ARTICULAR; INTRAMUSCULAR at 15:04

## 2025-07-11 ASSESSMENT — PAIN - FUNCTIONAL ASSESSMENT: PAIN_FUNCTIONAL_ASSESSMENT: 0-10

## 2025-07-11 ASSESSMENT — PAIN SCALES - GENERAL: PAINLEVEL_OUTOF10: 7

## 2025-07-11 ASSESSMENT — PAIN DESCRIPTION - DESCRIPTORS: DESCRIPTORS: SHARP

## 2025-07-11 NOTE — PROGRESS NOTES
Assessment/Plan   Encounter Diagnoses:  Chronic right shoulder pain  Acute pain of right shoulder  Assessment: Right shoulder rotator cuff tear supraspinatus and the anterior half of the infraspinatus with retraction past the glenoid.  Some atrophy of the infraspinatus is noted with atrophy of the teres minor.    Plan:  Physical therapy for preoperative evaluation and an attempt at generalized range of motion and strengthening.  His insurance is requiring physical therapy before they will approve his surgery.  However, therapy is usually not considered approved prequisite to surgical intervention for rotator cuff tears.  Nevertheless, the patient desires to have his surgery in December so he can use the winter break for the early postoperative period.  I once again reviewed the risks and benefits of the proposed procedure spending more time explaining about repair ability of cuff tears especially those that are more chronic.  He understands that delay does increase the likelihood that the cuff will not be reparable.  Follow-up in November for his preoperative assessment at which time we can make further plans for the exact type of surgery and rehabilitation.  He understands that he can follow-up sooner if need be.  For some early pain relief I did inject his subacromial space with 40 mg of Kenalog using ultrasound control.       Subjective    Patient ID: Ricardo Newman is a 59 y.o. male.    Chief Complaint: Pain of the Right Shoulder (FALL 1-4-25/MRI 4-23-25/X-RAYS 4-9-25/)     Last Surgery: No surgery found  Last Surgery Date: No surgery found    HPI  59-year-old male who comes in today for evaluation of his right shoulder.  An MRI had shown a rotator cuff tear.  We had requested surgical intervention but this was denied because he had not done physical therapy.  Generally physical therapy is not a prerequisite to moving forward with surgical intervention for rotator cuff tears.  The therapist can try to rehab the  other muscles in the shoulder girdle and discussed the postoperative routine with the patient and this can be somewhat helpful.  At this point Mr. Kennedy desires to have surgery in mid December 2 coincide with the holidays for school.          OBJECTIVE: ORTHO EXAM  Right shoulder exam  Forward elevation to 80 and then pain with guarding.  Extension to 40.  Abduction to 60 and then some pain with guarding.  With perseverance he is able to forward elevate to 150.  He is neurovascularly intact distally.  Strength testing is deferred but he has weakness to supraspinatus and infraspinatus isolation.  External rotation strength is 4+/5.    IMAGE RESULTS:  Point of Care Ultrasound  These images are not reportable by radiology and will not be interpreted   by  Radiologists.      ULTRASOUND      L Inj/Asp: R subacromial bursa on 7/11/2025 3:04 PM  Indications: pain  Details: ultrasound-guided anterior approach  Medications: 2.5 mg triamcinolone acetonide 40 mg/mL  Procedure, treatment alternatives, risks and benefits explained, specific risks discussed. Immediately prior to procedure a time out was called to verify the correct patient, procedure, equipment, support staff and site/side marked as required. Patient was prepped and draped in the usual sterile fashion.            Orders Placed This Encounter    Point of Care Ultrasound

## 2025-07-11 NOTE — ASSESSMENT & PLAN NOTE
Assessment: Right shoulder rotator cuff tear supraspinatus and the anterior half of the infraspinatus with retraction past the glenoid.  Some atrophy of the infraspinatus is noted with atrophy of the teres minor.    Plan:  Physical therapy for preoperative evaluation and an attempt at generalized range of motion and strengthening.  His insurance is requiring physical therapy before they will approve his surgery.  However, therapy is usually not considered approved prequisite to surgical intervention for rotator cuff tears.  Nevertheless, the patient desires to have his surgery in December so he can use the winter break for the early postoperative period.  I once again reviewed the risks and benefits of the proposed procedure spending more time explaining about repair ability of cuff tears especially those that are more chronic.  He understands that delay does increase the likelihood that the cuff will not be reparable.  Follow-up in November for his preoperative assessment at which time we can make further plans for the exact type of surgery and rehabilitation.  He understands that he can follow-up sooner if need be.  For some early pain relief I did inject his subacromial space with 40 mg of Kenalog using ultrasound control.

## 2025-07-14 ENCOUNTER — TELEPHONE (OUTPATIENT)
Dept: PRIMARY CARE | Facility: CLINIC | Age: 60
End: 2025-07-14
Payer: COMMERCIAL

## 2025-07-14 ENCOUNTER — PREP FOR PROCEDURE (OUTPATIENT)
Dept: ORTHOPEDIC SURGERY | Facility: CLINIC | Age: 60
End: 2025-07-14
Payer: COMMERCIAL

## 2025-07-14 DIAGNOSIS — F41.1 GAD (GENERALIZED ANXIETY DISORDER): ICD-10-CM

## 2025-07-14 DIAGNOSIS — M25.511 ACUTE PAIN OF RIGHT SHOULDER: ICD-10-CM

## 2025-07-14 RX ORDER — VENLAFAXINE HYDROCHLORIDE 150 MG/1
150 CAPSULE, EXTENDED RELEASE ORAL DAILY
Qty: 90 CAPSULE | Refills: 0 | Status: SHIPPED | OUTPATIENT
Start: 2025-07-14

## 2025-07-14 RX ORDER — VENLAFAXINE HYDROCHLORIDE 75 MG/1
75 CAPSULE, EXTENDED RELEASE ORAL DAILY
Qty: 90 CAPSULE | Refills: 0 | Status: SHIPPED | OUTPATIENT
Start: 2025-07-14

## 2025-08-04 ENCOUNTER — APPOINTMENT (OUTPATIENT)
Dept: PRIMARY CARE | Facility: CLINIC | Age: 60
End: 2025-08-04
Payer: COMMERCIAL

## 2025-11-06 ENCOUNTER — APPOINTMENT (OUTPATIENT)
Dept: ORTHOPEDIC SURGERY | Facility: CLINIC | Age: 60
End: 2025-11-06
Payer: COMMERCIAL

## 2025-11-07 ENCOUNTER — APPOINTMENT (OUTPATIENT)
Dept: ORTHOPEDIC SURGERY | Facility: CLINIC | Age: 60
End: 2025-11-07
Payer: COMMERCIAL